# Patient Record
Sex: FEMALE | Race: WHITE | NOT HISPANIC OR LATINO | Employment: FULL TIME | URBAN - METROPOLITAN AREA
[De-identification: names, ages, dates, MRNs, and addresses within clinical notes are randomized per-mention and may not be internally consistent; named-entity substitution may affect disease eponyms.]

---

## 2019-09-11 ENCOUNTER — HISTORICAL (OUTPATIENT)
Dept: ADMINISTRATIVE | Facility: HOSPITAL | Age: 39
End: 2019-09-11

## 2022-04-10 ENCOUNTER — HISTORICAL (OUTPATIENT)
Dept: ADMINISTRATIVE | Facility: HOSPITAL | Age: 42
End: 2022-04-10

## 2022-04-25 VITALS
DIASTOLIC BLOOD PRESSURE: 99 MMHG | WEIGHT: 118.81 LBS | OXYGEN SATURATION: 97 % | SYSTOLIC BLOOD PRESSURE: 138 MMHG | BODY MASS INDEX: 21.05 KG/M2 | HEIGHT: 63 IN

## 2022-11-25 ENCOUNTER — OFFICE VISIT (OUTPATIENT)
Dept: URGENT CARE | Facility: CLINIC | Age: 42
End: 2022-11-25

## 2022-11-25 ENCOUNTER — TELEPHONE (OUTPATIENT)
Dept: URGENT CARE | Facility: CLINIC | Age: 42
End: 2022-11-25

## 2022-11-25 VITALS
BODY MASS INDEX: 19.88 KG/M2 | WEIGHT: 108 LBS | TEMPERATURE: 100 F | SYSTOLIC BLOOD PRESSURE: 139 MMHG | HEART RATE: 89 BPM | DIASTOLIC BLOOD PRESSURE: 81 MMHG | OXYGEN SATURATION: 91 % | HEIGHT: 62 IN

## 2022-11-25 DIAGNOSIS — J20.9 BRONCHOSPASM WITH BRONCHITIS, ACUTE: Primary | ICD-10-CM

## 2022-11-25 DIAGNOSIS — R05.9 COUGH, UNSPECIFIED TYPE: ICD-10-CM

## 2022-11-25 LAB
CTP QC/QA: YES
POC MOLECULAR INFLUENZA A AGN: NEGATIVE
POC MOLECULAR INFLUENZA B AGN: NEGATIVE

## 2022-11-25 PROCEDURE — 87502 INFLUENZA DNA AMP PROBE: CPT | Mod: QW,,, | Performed by: FAMILY MEDICINE

## 2022-11-25 PROCEDURE — 87502 POCT INFLUENZA A/B MOLECULAR: ICD-10-PCS | Mod: QW,,, | Performed by: FAMILY MEDICINE

## 2022-11-25 PROCEDURE — 94640 PR INHAL RX, AIRWAY OBST/DX SPUTUM INDUCT: ICD-10-PCS | Mod: ,,, | Performed by: FAMILY MEDICINE

## 2022-11-25 PROCEDURE — 96372 THER/PROPH/DIAG INJ SC/IM: CPT | Mod: 59,,, | Performed by: FAMILY MEDICINE

## 2022-11-25 PROCEDURE — 96372 PR INJECTION,THERAP/PROPH/DIAG2ST, IM OR SUBCUT: ICD-10-PCS | Mod: 59,,, | Performed by: FAMILY MEDICINE

## 2022-11-25 PROCEDURE — 99203 OFFICE O/P NEW LOW 30 MIN: CPT | Mod: 25,,, | Performed by: FAMILY MEDICINE

## 2022-11-25 PROCEDURE — 99203 PR OFFICE/OUTPT VISIT, NEW, LEVL III, 30-44 MIN: ICD-10-PCS | Mod: 25,,, | Performed by: FAMILY MEDICINE

## 2022-11-25 PROCEDURE — 94640 AIRWAY INHALATION TREATMENT: CPT | Mod: ,,, | Performed by: FAMILY MEDICINE

## 2022-11-25 RX ORDER — DEXAMETHASONE SODIUM PHOSPHATE 100 MG/10ML
4 INJECTION INTRAMUSCULAR; INTRAVENOUS
Status: COMPLETED | OUTPATIENT
Start: 2022-11-25 | End: 2022-11-25

## 2022-11-25 RX ORDER — DEXAMETHASONE SODIUM PHOSPHATE 4 MG/ML
4 INJECTION, SOLUTION INTRA-ARTICULAR; INTRALESIONAL; INTRAMUSCULAR; INTRAVENOUS; SOFT TISSUE
Status: DISCONTINUED | OUTPATIENT
Start: 2022-11-25 | End: 2022-11-25

## 2022-11-25 RX ORDER — PREDNISONE 20 MG/1
20 TABLET ORAL 2 TIMES DAILY
Qty: 4 TABLET | Refills: 0 | Status: SHIPPED | OUTPATIENT
Start: 2022-11-26 | End: 2022-11-28

## 2022-11-25 RX ORDER — LISDEXAMFETAMINE DIMESYLATE 40 MG/1
40 CAPSULE ORAL DAILY
COMMUNITY
Start: 2022-11-16 | End: 2023-06-05

## 2022-11-25 RX ORDER — AZITHROMYCIN 250 MG/1
TABLET, FILM COATED ORAL
Qty: 6 TABLET | Refills: 0 | Status: SHIPPED | OUTPATIENT
Start: 2022-11-25 | End: 2023-06-05

## 2022-11-25 RX ORDER — ALPRAZOLAM 0.25 MG/1
0.25 TABLET ORAL 2 TIMES DAILY PRN
COMMUNITY
Start: 2022-11-20 | End: 2023-09-21

## 2022-11-25 RX ORDER — IPRATROPIUM BROMIDE AND ALBUTEROL SULFATE 2.5; .5 MG/3ML; MG/3ML
3 SOLUTION RESPIRATORY (INHALATION)
Status: COMPLETED | OUTPATIENT
Start: 2022-11-25 | End: 2022-11-25

## 2022-11-25 RX ORDER — CITALOPRAM 40 MG/1
40 TABLET, FILM COATED ORAL DAILY
COMMUNITY
Start: 2022-11-07 | End: 2023-09-21

## 2022-11-25 RX ORDER — ALBUTEROL SULFATE 90 UG/1
2 AEROSOL, METERED RESPIRATORY (INHALATION) EVERY 6 HOURS PRN
Qty: 18 G | Refills: 0 | Status: SHIPPED | OUTPATIENT
Start: 2022-11-25 | End: 2023-06-05

## 2022-11-25 RX ADMIN — DEXAMETHASONE SODIUM PHOSPHATE 4 MG: 100 INJECTION INTRAMUSCULAR; INTRAVENOUS at 09:11

## 2022-11-25 RX ADMIN — IPRATROPIUM BROMIDE AND ALBUTEROL SULFATE 3 ML: 2.5; .5 SOLUTION RESPIRATORY (INHALATION) at 09:11

## 2022-11-25 NOTE — PROGRESS NOTES
"Subjective:       Patient ID: Brittany Hines is a 42 y.o. female.    Vitals:  height is 5' 2" (1.575 m) and weight is 49 kg (108 lb). Her temperature is 99.6 °F (37.6 °C). Her blood pressure is 139/81 and her pulse is 89. Her oxygen saturation is 91% (abnormal).     Chief Complaint: Shortness of Breath (Feels like she can't take a deep breath worsening x 2d, dx with bronchitis annually per pt.)    HPI:  42-year-old female present to clinic with concerns of feeling short of breath since 2 days and worsening.  Diagnosed with bronchitis and Ricardo.  Admits to smoke tobacco.  No history of asthma.  Sats in the clinic 91-93%.  Temp in the clinic 99.6.  States possibly painting in the house and sugar in burning trigger her symptoms.  Admits to smoke tobacco, trying to wean.  Has tolerated Decadron in the past with no issues, understands the risks and benefits    ROS  :  Constitutional : _No fatigue, No Fever  HEENT : _No sore throat, no ear pain, no sinus congestion  Neck : No pain, range of motion present  Respiratory : _Coughing, mucus production  Cardiovascular : _No chest pain, no palpitations  Gastrointestinal : _No vomiting or diarrhea. No abdominal pain  Integumentary : _No skin rash     Objective:      Physical Exam    General : Alert and Oriented, No apparent distress, afebrile, stuffy congested and coughing  Neck - supple  HENT : Oropharynx no redness or swelling. TMs intact mild fluid no redness.   Respiratory : Bilateral equal breath sounds, nonlabored respirations, diffuse expiratory wheezing bilateral basal lung fields, decreased breath sounds, scattered rhonchi  Cardiovascular : Rate, rhythm regular, normal volume pulse, no murmur  Integumentary : Warm, Dry and no rash    After breathing treatment O2 sats much improved to 93%, patient's symptoms much improved.  States initially at home O2 sats was 88%  Assessment:       1. Bronchospasm with bronchitis, acute    2. Cough, unspecified type      "     Plan:     Cool mist vaporizer and cough drop to keep there was moist.  DuoNeb treatment in the clinic today, symptoms much improved including the O2 sats  Encouraged to monitor the symptoms closely.  Adequate hydration and rest.  Flu test negative in the clinic today  Reviewed the chest x-ray, no concerns of pneumonia.  Radiology final results will be monitored and reported  Medications as directed.  Encouraged to start ProAir as needed after 4 hours, patient had neb treatment in the clinic.  Voiced understanding  Tylenol ibuprofen for fever and body aches.  Avoid the possible triggers.  ER precautions with any acute change in symptoms  Call this clinic for any questions  Encouraged to quit smoking, congratulations as patient currently weaning off the cigarettes    Bronchospasm with bronchitis, acute  -     albuterol-ipratropium 2.5 mg-0.5 mg/3 mL nebulizer solution 3 mL  -     Discontinue: dexAMETHasone injection 4 mg  -     dexAMETHasone injection 4 mg  -     XR CHEST PA AND LATERAL; Future; Expected date: 11/25/2022  -     albuterol (PROAIR HFA) 90 mcg/actuation inhaler; Inhale 2 puffs into the lungs every 6 (six) hours as needed for Wheezing or Shortness of Breath. Rescue  Dispense: 18 g; Refill: 0  -     azithromycin (Z-MITCHELL) 250 MG tablet; Take 2 tablets by mouth on day 1; Take 1 tablet by mouth on days 2-5  Dispense: 6 tablet; Refill: 0  -     predniSONE (DELTASONE) 20 MG tablet; Take 1 tablet (20 mg total) by mouth 2 (two) times daily. for 2 days  Dispense: 4 tablet; Refill: 0    Cough, unspecified type  -     POCT Influenza A/B MOLECULAR  -     dexAMETHasone injection 4 mg

## 2022-11-25 NOTE — PATIENT INSTRUCTIONS
Cool mist vaporizer and cough drop to keep there was moist.  DuoNeb treatment in the clinic today, symptoms much improved including the O2 sats  Encouraged to monitor the symptoms closely.  Adequate hydration and rest.  Flu test negative in the clinic today  Reviewed the chest x-ray, no concerns of pneumonia.  Radiology final results will be monitored and reported  Medications as directed.  Encouraged to start ProAir as needed after 4 hours, patient had neb treatment in the clinic.  Voiced understanding  Tylenol ibuprofen for fever and body aches.  Avoid the possible triggers.  ER precautions with any acute change in symptoms  Call this clinic for any questions  Encouraged to quit smoking, congratulations as patient currently weaning off the cigarettes

## 2022-11-27 ENCOUNTER — TELEPHONE (OUTPATIENT)
Dept: URGENT CARE | Facility: CLINIC | Age: 42
End: 2022-11-27

## 2022-11-27 NOTE — TELEPHONE ENCOUNTER
3rd attempt to contact pt no answer left vm       ----- Message from Brenda Ayon, RT sent at 11/26/2022  9:09 AM CST -----  2nd attempt to contact pt no answer left vm   ----- Message -----  From: Brenda Ayon, RT  Sent: 11/25/2022   1:31 PM CST  To: [facility] Clinical Support Staff    Tried calling pt with xray results- no answer left vm   ----- Message -----  From: Brenda Ayon, RT  Sent: 11/25/2022   1:07 PM CST  To: [facility] Clinical Support Staff

## 2023-06-05 ENCOUNTER — OFFICE VISIT (OUTPATIENT)
Dept: INTERNAL MEDICINE | Facility: CLINIC | Age: 43
End: 2023-06-05
Payer: COMMERCIAL

## 2023-06-05 VITALS
SYSTOLIC BLOOD PRESSURE: 118 MMHG | BODY MASS INDEX: 18.43 KG/M2 | HEIGHT: 63 IN | TEMPERATURE: 98 F | HEART RATE: 72 BPM | WEIGHT: 104 LBS | DIASTOLIC BLOOD PRESSURE: 74 MMHG | OXYGEN SATURATION: 98 %

## 2023-06-05 DIAGNOSIS — F98.8 ATTENTION DEFICIT DISORDER (ADD) WITHOUT HYPERACTIVITY: ICD-10-CM

## 2023-06-05 DIAGNOSIS — F41.9 ANXIETY AND DEPRESSION: ICD-10-CM

## 2023-06-05 DIAGNOSIS — Z00.00 WELLNESS EXAMINATION: Primary | ICD-10-CM

## 2023-06-05 DIAGNOSIS — F32.A ANXIETY AND DEPRESSION: ICD-10-CM

## 2023-06-05 DIAGNOSIS — Z76.89 ESTABLISHING CARE WITH NEW DOCTOR, ENCOUNTER FOR: ICD-10-CM

## 2023-06-05 PROCEDURE — 1159F PR MEDICATION LIST DOCUMENTED IN MEDICAL RECORD: ICD-10-PCS | Mod: CPTII,,, | Performed by: INTERNAL MEDICINE

## 2023-06-05 PROCEDURE — 1160F RVW MEDS BY RX/DR IN RCRD: CPT | Mod: CPTII,,, | Performed by: INTERNAL MEDICINE

## 2023-06-05 PROCEDURE — 1159F MED LIST DOCD IN RCRD: CPT | Mod: CPTII,,, | Performed by: INTERNAL MEDICINE

## 2023-06-05 PROCEDURE — 3008F BODY MASS INDEX DOCD: CPT | Mod: CPTII,,, | Performed by: INTERNAL MEDICINE

## 2023-06-05 PROCEDURE — 3078F PR MOST RECENT DIASTOLIC BLOOD PRESSURE < 80 MM HG: ICD-10-PCS | Mod: CPTII,,, | Performed by: INTERNAL MEDICINE

## 2023-06-05 PROCEDURE — 99204 OFFICE O/P NEW MOD 45 MIN: CPT | Mod: ,,, | Performed by: INTERNAL MEDICINE

## 2023-06-05 PROCEDURE — 3008F PR BODY MASS INDEX (BMI) DOCUMENTED: ICD-10-PCS | Mod: CPTII,,, | Performed by: INTERNAL MEDICINE

## 2023-06-05 PROCEDURE — 1160F PR REVIEW ALL MEDS BY PRESCRIBER/CLIN PHARMACIST DOCUMENTED: ICD-10-PCS | Mod: CPTII,,, | Performed by: INTERNAL MEDICINE

## 2023-06-05 PROCEDURE — 3078F DIAST BP <80 MM HG: CPT | Mod: CPTII,,, | Performed by: INTERNAL MEDICINE

## 2023-06-05 PROCEDURE — 3074F PR MOST RECENT SYSTOLIC BLOOD PRESSURE < 130 MM HG: ICD-10-PCS | Mod: CPTII,,, | Performed by: INTERNAL MEDICINE

## 2023-06-05 PROCEDURE — 99204 PR OFFICE/OUTPT VISIT, NEW, LEVL IV, 45-59 MIN: ICD-10-PCS | Mod: ,,, | Performed by: INTERNAL MEDICINE

## 2023-06-05 PROCEDURE — 3074F SYST BP LT 130 MM HG: CPT | Mod: CPTII,,, | Performed by: INTERNAL MEDICINE

## 2023-06-05 RX ORDER — LISDEXAMFETAMINE DIMESYLATE 30 MG/1
30 CAPSULE ORAL EVERY MORNING
Qty: 30 CAPSULE | Refills: 0 | Status: SHIPPED | OUTPATIENT
Start: 2023-06-05 | End: 2023-07-13 | Stop reason: SDUPTHER

## 2023-06-05 NOTE — ASSESSMENT & PLAN NOTE
-labs will be ordered for next visit  -patient is advised on importance of watching her carbohydrate intake and saturated fat intake, making the right nutritional choices and exercising on a regular basis  -up-to-date with the screening

## 2023-06-05 NOTE — ASSESSMENT & PLAN NOTE
-on Vyvanse 40 mg p.o. daily   -patient would like to go back down on the dose and we discussed going down to 30 mg and slowly titrating down as tolerated and she is in agreement   -currently tolerating it well without any issues or excessive side effects

## 2023-06-05 NOTE — PROGRESS NOTES
Subjective:      Patient ID: Brittany Hines is a 42 y.o. female.    Chief Complaint: No chief complaint on file.    Brittany is a 42-year-old female who is here today to establish care.  Medical comorbidities are significant for anxiety/depression as well as ADHD.    Overall doing well and has no acute needs or complaints however does have some guilt from the death of her father and hence continues to struggle with anxiety/depression.    Would like to scale back on the dose of the Vyvanse.    Had a long discussion regarding the Xanax and using it only on an as needed basis.    Pap:  request records  MMG:  Ordered today      Gyn:  Dr. Agustin    The patient's Health Maintenance was reviewed and the following appears to be due at this time:   Health Maintenance Due   Topic Date Due    Hepatitis C Screening  Never done    Cervical Cancer Screening  Never done    Lipid Panel  Never done    COVID-19 Vaccine (1) Never done    Pneumococcal Vaccines (Age 0-64) (1 - PCV) Never done    HIV Screening  Never done    TETANUS VACCINE  Never done    Mammogram  Never done        Past Medical History:  Past Medical History:   Diagnosis Date    Anxiety     Depression      Past Surgical History:   Procedure Laterality Date    NO PAST SURGERIES       Review of patient's allergies indicates:  No Known Allergies  Social History     Socioeconomic History    Marital status: Single   Tobacco Use    Smoking status: Every Day     Packs/day: 1.00     Years: 15.00     Pack years: 15.00     Types: Cigarettes     Start date: 1995    Smokeless tobacco: Never   Substance and Sexual Activity    Alcohol use: Yes     Comment: occasionally    Drug use: Not Currently    Sexual activity: Not Currently     Family History   Problem Relation Age of Onset    Heart disease Mother     Heart disease Father     Heart disease Maternal Grandmother     Heart disease Maternal Grandfather        Review of Systems    A comprehensive review of systems was  "performed and is negative except for that stated above  Objective:   /74   Pulse 72   Temp 98.1 °F (36.7 °C)   Ht 5' 3" (1.6 m)   Wt 47.2 kg (104 lb)   SpO2 98%   BMI 18.42 kg/m²     Physical Exam  Constitutional:       Appearance: Normal appearance.   HENT:      Head: Normocephalic and atraumatic.      Nose: Nose normal.      Mouth/Throat:      Mouth: Mucous membranes are moist.      Pharynx: Oropharynx is clear.   Eyes:      Extraocular Movements: Extraocular movements intact.      Pupils: Pupils are equal, round, and reactive to light.   Cardiovascular:      Rate and Rhythm: Normal rate and regular rhythm.      Pulses: Normal pulses.   Pulmonary:      Effort: Pulmonary effort is normal.      Breath sounds: Normal breath sounds.   Abdominal:      General: Bowel sounds are normal.      Palpations: Abdomen is soft.   Musculoskeletal:         General: Normal range of motion.      Cervical back: Normal range of motion and neck supple.   Skin:     General: Skin is warm.   Neurological:      General: No focal deficit present.      Mental Status: She is alert and oriented to person, place, and time. Mental status is at baseline.   Psychiatric:         Mood and Affect: Mood normal.     Assessment/ Plan:   1. Establishing care with new doctor, encounter for  Assessment & Plan:  -labs will be ordered for next visit  -patient is advised on importance of watching her carbohydrate intake and saturated fat intake, making the right nutritional choices and exercising on a regular basis  -up-to-date with the screening      2. Anxiety and depression  Assessment & Plan:  -on Celexa 40 mg p.o. daily and alprazolam 0.25 mg p.o. b.i.d., advised to use the Xanax only on a very p.r.n. basis and to refrain from using it daily.  Verbalizes understanding  Practice deep breathing or abdominal breathing exercises when anxiety occurs.  Exercise daily. Get sunlight daily.  Avoid caffeine, alcohol and stimulants.  Practice positive " phrases and repeat throughout the day, along with yoga and relaxation techniques.  Set healthy boundaries, avoid people and conversations that increase stress.  Educated patient on the risks of serotonin based medications such as serotonin modulators and SSRIs/SNRIs including common side effects of nausea, GI upset, headache dizziness as well as the rare risk for worsening symptoms of depression including development of suicidal thoughts or ideations, and serotonin syndrome.         3. Attention deficit disorder (ADD) without hyperactivity  Assessment & Plan:  -on Vyvanse 40 mg p.o. daily   -patient would like to go back down on the dose and we discussed going down to 30 mg and slowly titrating down as tolerated and she is in agreement   -currently tolerating it well without any issues or excessive side effects           Other orders  -     lisdexamfetamine (VYVANSE) 30 MG capsule; Take 1 capsule (30 mg total) by mouth every morning.  Dispense: 30 capsule; Refill: 0

## 2023-06-05 NOTE — ASSESSMENT & PLAN NOTE
-on Celexa 40 mg p.o. daily and alprazolam 0.25 mg p.o. b.i.d., advised to use the Xanax only on a very p.r.n. basis and to refrain from using it daily.  Verbalizes understanding  Practice deep breathing or abdominal breathing exercises when anxiety occurs.  Exercise daily. Get sunlight daily.  Avoid caffeine, alcohol and stimulants.  Practice positive phrases and repeat throughout the day, along with yoga and relaxation techniques.  Set healthy boundaries, avoid people and conversations that increase stress.  Educated patient on the risks of serotonin based medications such as serotonin modulators and SSRIs/SNRIs including common side effects of nausea, GI upset, headache dizziness as well as the rare risk for worsening symptoms of depression including development of suicidal thoughts or ideations, and serotonin syndrome.

## 2023-07-13 DIAGNOSIS — F98.8 ATTENTION DEFICIT DISORDER (ADD) WITHOUT HYPERACTIVITY: Primary | ICD-10-CM

## 2023-07-13 RX ORDER — LISDEXAMFETAMINE DIMESYLATE 30 MG/1
30 CAPSULE ORAL EVERY MORNING
Qty: 30 CAPSULE | Refills: 0 | Status: SHIPPED | OUTPATIENT
Start: 2023-07-13 | End: 2023-08-14 | Stop reason: SDUPTHER

## 2023-08-14 DIAGNOSIS — F98.8 ATTENTION DEFICIT DISORDER (ADD) WITHOUT HYPERACTIVITY: ICD-10-CM

## 2023-08-14 RX ORDER — LISDEXAMFETAMINE DIMESYLATE 30 MG/1
30 CAPSULE ORAL EVERY MORNING
Qty: 30 CAPSULE | Refills: 0 | Status: SHIPPED | OUTPATIENT
Start: 2023-08-14 | End: 2023-09-14

## 2023-08-24 DIAGNOSIS — Z00.00 WELLNESS EXAMINATION: Primary | ICD-10-CM

## 2023-08-28 ENCOUNTER — TELEPHONE (OUTPATIENT)
Dept: INTERNAL MEDICINE | Facility: CLINIC | Age: 43
End: 2023-08-28
Payer: COMMERCIAL

## 2023-08-28 NOTE — TELEPHONE ENCOUNTER
----- Message from Soledad Ramirez MA sent at 8/24/2023  7:59 AM CDT -----  Regarding: Dr AMANDA HYATT Tuesday 9-5-23       1. Are there any outstanding Labs, imaging or referrals in the patient's chart?      Wellness Appointment    Fasting wellness labs ordered and ready to do.     New Pt 6-5-23           2. . Has the patient been seen in an ER, urgent care clinic, or any other health care    provider since their last visit? If yes when and where?

## 2023-09-14 ENCOUNTER — TELEPHONE (OUTPATIENT)
Dept: INTERNAL MEDICINE | Facility: CLINIC | Age: 43
End: 2023-09-14
Payer: COMMERCIAL

## 2023-09-14 RX ORDER — LISDEXAMFETAMINE DIMESYLATE CAPSULES 20 MG/1
20 CAPSULE ORAL EVERY MORNING
Qty: 30 CAPSULE | Refills: 0 | Status: SHIPPED | OUTPATIENT
Start: 2023-09-14 | End: 2023-09-21

## 2023-09-14 NOTE — TELEPHONE ENCOUNTER
Medications Ordered This Encounter   Medications    lisdexamfetamine (VYVANSE) 20 MG capsule     Sig: Take 1 capsule (20 mg total) by mouth every morning.     Dispense:  30 capsule     Refill:  0      Decreased dose of Vyvanse to 20 mg, prescription sent, please notify patient, thank you

## 2023-09-14 NOTE — TELEPHONE ENCOUNTER
----- Message from Kimberly Marie sent at 9/14/2023  9:06 AM CDT -----  Regarding: refill  Type:  RX Refill Request    Who Called: pt  Refill or New Rx:refill  RX Name and Strength:lisdexamfetamine (VYVANSE) 30 MG capsule  How is the patient currently taking it? (ex. 1XDay):1 day  Is this a 30 day or 90 day RX:  [facility]  Local or Mail Order:local  Ordering Provider:dr chapman  Would the patient rather a call back or a response via [facility]? C/b  Best Call Back Number:[phone]    Additional Information: pt would like to discuss lowering the dosage

## 2023-09-21 ENCOUNTER — OFFICE VISIT (OUTPATIENT)
Dept: INTERNAL MEDICINE | Facility: CLINIC | Age: 43
End: 2023-09-21
Payer: COMMERCIAL

## 2023-09-21 VITALS
RESPIRATION RATE: 18 BRPM | TEMPERATURE: 98 F | DIASTOLIC BLOOD PRESSURE: 68 MMHG | HEART RATE: 91 BPM | WEIGHT: 107.38 LBS | SYSTOLIC BLOOD PRESSURE: 122 MMHG | OXYGEN SATURATION: 99 % | BODY MASS INDEX: 19.03 KG/M2

## 2023-09-21 DIAGNOSIS — Z12.31 BREAST CANCER SCREENING BY MAMMOGRAM: ICD-10-CM

## 2023-09-21 DIAGNOSIS — Z12.4 CERVICAL CANCER SCREENING: ICD-10-CM

## 2023-09-21 DIAGNOSIS — F98.8 ATTENTION DEFICIT DISORDER (ADD) WITHOUT HYPERACTIVITY: Chronic | ICD-10-CM

## 2023-09-21 DIAGNOSIS — Z23 NEED FOR INFLUENZA VACCINATION: ICD-10-CM

## 2023-09-21 DIAGNOSIS — F41.9 ANXIETY AND DEPRESSION: Chronic | ICD-10-CM

## 2023-09-21 DIAGNOSIS — B02.8 HERPES ZOSTER WITH COMPLICATION: ICD-10-CM

## 2023-09-21 DIAGNOSIS — F32.A ANXIETY AND DEPRESSION: Chronic | ICD-10-CM

## 2023-09-21 DIAGNOSIS — Z00.00 WELLNESS EXAMINATION: Primary | ICD-10-CM

## 2023-09-21 DIAGNOSIS — Z12.11 SCREENING FOR COLORECTAL CANCER: ICD-10-CM

## 2023-09-21 DIAGNOSIS — Z12.12 SCREENING FOR COLORECTAL CANCER: ICD-10-CM

## 2023-09-21 PROBLEM — Z76.89 ESTABLISHING CARE WITH NEW DOCTOR, ENCOUNTER FOR: Status: RESOLVED | Noted: 2023-06-05 | Resolved: 2023-09-21

## 2023-09-21 PROCEDURE — 1160F RVW MEDS BY RX/DR IN RCRD: CPT | Mod: CPTII,,,

## 2023-09-21 PROCEDURE — 3074F PR MOST RECENT SYSTOLIC BLOOD PRESSURE < 130 MM HG: ICD-10-PCS | Mod: CPTII,,,

## 2023-09-21 PROCEDURE — 1159F PR MEDICATION LIST DOCUMENTED IN MEDICAL RECORD: ICD-10-PCS | Mod: CPTII,,,

## 2023-09-21 PROCEDURE — 99396 PR PREVENTIVE VISIT,EST,40-64: ICD-10-PCS | Mod: ,,,

## 2023-09-21 PROCEDURE — 3008F PR BODY MASS INDEX (BMI) DOCUMENTED: ICD-10-PCS | Mod: CPTII,,,

## 2023-09-21 PROCEDURE — 3008F BODY MASS INDEX DOCD: CPT | Mod: CPTII,,,

## 2023-09-21 PROCEDURE — 3078F PR MOST RECENT DIASTOLIC BLOOD PRESSURE < 80 MM HG: ICD-10-PCS | Mod: CPTII,,,

## 2023-09-21 PROCEDURE — 3074F SYST BP LT 130 MM HG: CPT | Mod: CPTII,,,

## 2023-09-21 PROCEDURE — 1160F PR REVIEW ALL MEDS BY PRESCRIBER/CLIN PHARMACIST DOCUMENTED: ICD-10-PCS | Mod: CPTII,,,

## 2023-09-21 PROCEDURE — 99396 PREV VISIT EST AGE 40-64: CPT | Mod: ,,,

## 2023-09-21 PROCEDURE — 3078F DIAST BP <80 MM HG: CPT | Mod: CPTII,,,

## 2023-09-21 PROCEDURE — 1159F MED LIST DOCD IN RCRD: CPT | Mod: CPTII,,,

## 2023-09-21 RX ORDER — ALPRAZOLAM 0.5 MG/1
0.5 TABLET ORAL 2 TIMES DAILY PRN
COMMUNITY
Start: 2023-09-11

## 2023-09-21 RX ORDER — VALACYCLOVIR HYDROCHLORIDE 1 G/1
1000 TABLET, FILM COATED ORAL 3 TIMES DAILY
Qty: 21 TABLET | Refills: 0 | Status: SHIPPED | OUTPATIENT
Start: 2023-09-21 | End: 2023-12-28

## 2023-09-21 RX ORDER — DEXTROAMPHETAMINE SACCHARATE, AMPHETAMINE ASPARTATE MONOHYDRATE, DEXTROAMPHETAMINE SULFATE AND AMPHETAMINE SULFATE 2.5; 2.5; 2.5; 2.5 MG/1; MG/1; MG/1; MG/1
10 CAPSULE, EXTENDED RELEASE ORAL EVERY MORNING
Qty: 30 CAPSULE | Refills: 0 | Status: SHIPPED | OUTPATIENT
Start: 2023-10-11 | End: 2023-11-20 | Stop reason: SDUPTHER

## 2023-09-21 RX ORDER — GABAPENTIN 100 MG/1
100 CAPSULE ORAL 2 TIMES DAILY
Qty: 28 CAPSULE | Refills: 0 | Status: SHIPPED | OUTPATIENT
Start: 2023-09-21 | End: 2023-12-28

## 2023-09-21 RX ORDER — CITALOPRAM 10 MG/1
20 TABLET ORAL DAILY
Qty: 60 TABLET | Refills: 2 | Status: SHIPPED | OUTPATIENT
Start: 2023-09-21 | End: 2024-04-01

## 2023-09-21 NOTE — ASSESSMENT & PLAN NOTE
-age-appropriate screenings reviewed and cervical cancer screening noted due  -cervical cancer screening discussed. Patient verbalized understanding of importance of completing   -referred to gyn

## 2023-09-21 NOTE — ASSESSMENT & PLAN NOTE
-stable  -requesting to switch from Vyvanse to cheaper altered; discussed Adderall XR formulary   -currently on Vyvanse 20 mg, discontinue  -initiate Adderall XR 10 mg daily, titrate up as need  -follow-up q.3 months for refills  -notify clinic for new or worsening symtoms

## 2023-09-21 NOTE — ASSESSMENT & PLAN NOTE
-age-appropriate screenings reviewed and mammogram noted due  -breast cancer screening discussed. Patient verbalized understanding   -order mammogram today

## 2023-09-21 NOTE — ASSESSMENT & PLAN NOTE
-stable   -attempting to wean down off of Celexa   -currently on Celexa 20 mg, prescribe Celexa 10 mg tablet to facilitate  -also on Xanax 0.5 mg b.i.d. p.r.n., encouraged to use sparingly

## 2023-09-21 NOTE — ASSESSMENT & PLAN NOTE
-age-appropriate screenings reviewed and colorectal cancer screening noted due  -colorectal cancer screening screening significance discussed. Patient verbalized understanding   -screening methods discussed  -order Referral for colonoscopy Today

## 2023-09-21 NOTE — ASSESSMENT & PLAN NOTE
-acute  -initiate valacyclovir a 1000 mg t.i.d. x7 days   -initiate gabapentin 100 mg b.i.d. for nerve pain   -okay to utilize OTC topical lidocaine   -hand hygiene encouraged   -shingles vaccine disccused

## 2023-09-21 NOTE — ASSESSMENT & PLAN NOTE
-patient feeling generally well today  -will obtain wellness labs within next few days  -age-appropriate screenings reviewed, order mammogram, refer for Pap and colon  -immunizations ,reviewed and discussed  -encourage routine aerobic exercise 2 to 3 times a week  -increase fluid hydration

## 2023-09-21 NOTE — ASSESSMENT & PLAN NOTE
-immunizations reviewed and influenza vaccine noted due  -influenza l infection and immunization discussed. Patient verbalized understanding   -reports will obtain influenza vaccine at work

## 2023-10-17 ENCOUNTER — PATIENT MESSAGE (OUTPATIENT)
Dept: ADMINISTRATIVE | Facility: HOSPITAL | Age: 43
End: 2023-10-17
Payer: COMMERCIAL

## 2023-11-20 DIAGNOSIS — F98.8 ATTENTION DEFICIT DISORDER (ADD) WITHOUT HYPERACTIVITY: Chronic | ICD-10-CM

## 2023-11-20 RX ORDER — DEXTROAMPHETAMINE SACCHARATE, AMPHETAMINE ASPARTATE MONOHYDRATE, DEXTROAMPHETAMINE SULFATE AND AMPHETAMINE SULFATE 2.5; 2.5; 2.5; 2.5 MG/1; MG/1; MG/1; MG/1
10 CAPSULE, EXTENDED RELEASE ORAL EVERY MORNING
Qty: 30 CAPSULE | Refills: 0 | Status: SHIPPED | OUTPATIENT
Start: 2023-11-20 | End: 2023-12-20 | Stop reason: SDUPTHER

## 2023-11-20 NOTE — TELEPHONE ENCOUNTER
----- Message from Carlie Díaz sent at 11/20/2023  9:24 AM CST -----  Regarding: refill  ,Type:  RX Refill Request    Who Called: Brittany  Refill or New Rx:refill  RX Name and Strength:dextroamphetamine-amphetamine (ADDERALL XR) 10 MG 24 hr capsule  How is the patient currently taking it? (ex. 1XDay):  Is this a 30 day or 90 day RX:  [facility]  Local or Mail Order:  Ordering Provider:  Would the patient rather a call back or a response via [facility]?   Best Call Back Number:  Additional Information:

## 2023-12-20 DIAGNOSIS — F98.8 ATTENTION DEFICIT DISORDER (ADD) WITHOUT HYPERACTIVITY: Chronic | ICD-10-CM

## 2023-12-20 RX ORDER — DEXTROAMPHETAMINE SACCHARATE, AMPHETAMINE ASPARTATE MONOHYDRATE, DEXTROAMPHETAMINE SULFATE AND AMPHETAMINE SULFATE 2.5; 2.5; 2.5; 2.5 MG/1; MG/1; MG/1; MG/1
10 CAPSULE, EXTENDED RELEASE ORAL EVERY MORNING
Qty: 30 CAPSULE | Refills: 0 | Status: SHIPPED | OUTPATIENT
Start: 2023-12-20 | End: 2023-12-28 | Stop reason: SDUPTHER

## 2023-12-20 NOTE — TELEPHONE ENCOUNTER
----- Message from Carlie Díaz sent at 12/20/2023 10:53 AM CST -----  Regarding: refill  Type:  RX Refill Request    Who Called: Brittany    Refill or New Rx:refill    RX Name and Strength:dextroamphetamine-amphetamine (ADDERALL XR) 10 MG 24 hr capsule    How is the patient currently taking it? (ex. 1XDay):    Is this a 30 day or 90 day RX:    [facility]erred [facility] with phone number[facility]    Local or Mail Order:    Ordering Provider:    Would the patient rather a call back or a response via [facility]?     Best Call Back Number:    Additional Information:

## 2023-12-25 PROBLEM — Z00.00 WELLNESS EXAMINATION: Status: RESOLVED | Noted: 2023-09-21 | Resolved: 2023-12-25

## 2023-12-28 ENCOUNTER — TELEPHONE (OUTPATIENT)
Dept: INTERNAL MEDICINE | Facility: CLINIC | Age: 43
End: 2023-12-28

## 2023-12-28 ENCOUNTER — OFFICE VISIT (OUTPATIENT)
Dept: INTERNAL MEDICINE | Facility: CLINIC | Age: 43
End: 2023-12-28
Payer: COMMERCIAL

## 2023-12-28 VITALS
TEMPERATURE: 101 F | OXYGEN SATURATION: 96 % | BODY MASS INDEX: 18 KG/M2 | RESPIRATION RATE: 16 BRPM | DIASTOLIC BLOOD PRESSURE: 78 MMHG | HEART RATE: 93 BPM | WEIGHT: 101.63 LBS | SYSTOLIC BLOOD PRESSURE: 126 MMHG

## 2023-12-28 DIAGNOSIS — F32.A ANXIETY AND DEPRESSION: Chronic | ICD-10-CM

## 2023-12-28 DIAGNOSIS — J06.9 UPPER RESPIRATORY TRACT INFECTION, UNSPECIFIED TYPE: ICD-10-CM

## 2023-12-28 DIAGNOSIS — J10.1 INFLUENZA B: Primary | ICD-10-CM

## 2023-12-28 DIAGNOSIS — J06.9 UPPER RESPIRATORY TRACT INFECTION, UNSPECIFIED TYPE: Primary | ICD-10-CM

## 2023-12-28 DIAGNOSIS — R05.9 COUGH, UNSPECIFIED TYPE: ICD-10-CM

## 2023-12-28 DIAGNOSIS — F41.9 ANXIETY AND DEPRESSION: Chronic | ICD-10-CM

## 2023-12-28 DIAGNOSIS — J02.9 SORE THROAT: ICD-10-CM

## 2023-12-28 DIAGNOSIS — F98.8 ATTENTION DEFICIT DISORDER (ADD) WITHOUT HYPERACTIVITY: Chronic | ICD-10-CM

## 2023-12-28 DIAGNOSIS — R06.2 WHEEZING: ICD-10-CM

## 2023-12-28 LAB
CTP QC/QA: YES
FLUAV AG UPPER RESP QL IA.RAPID: NOT DETECTED
FLUBV AG UPPER RESP QL IA.RAPID: DETECTED
MOLECULAR STREP A: NEGATIVE
RSV A 5' UTR RNA NPH QL NAA+PROBE: NOT DETECTED
SARS-COV-2 RNA RESP QL NAA+PROBE: NOT DETECTED

## 2023-12-28 PROCEDURE — 1159F PR MEDICATION LIST DOCUMENTED IN MEDICAL RECORD: ICD-10-PCS | Mod: CPTII,,,

## 2023-12-28 PROCEDURE — 3074F SYST BP LT 130 MM HG: CPT | Mod: CPTII,,,

## 2023-12-28 PROCEDURE — 99214 PR OFFICE/OUTPT VISIT, EST, LEVL IV, 30-39 MIN: ICD-10-PCS | Mod: 25,,,

## 2023-12-28 PROCEDURE — 3074F PR MOST RECENT SYSTOLIC BLOOD PRESSURE < 130 MM HG: ICD-10-PCS | Mod: CPTII,,,

## 2023-12-28 PROCEDURE — 99214 OFFICE O/P EST MOD 30 MIN: CPT | Mod: 25,,,

## 2023-12-28 PROCEDURE — 1160F PR REVIEW ALL MEDS BY PRESCRIBER/CLIN PHARMACIST DOCUMENTED: ICD-10-PCS | Mod: CPTII,,,

## 2023-12-28 PROCEDURE — 96372 PR INJECTION,THERAP/PROPH/DIAG2ST, IM OR SUBCUT: ICD-10-PCS | Mod: ,,,

## 2023-12-28 PROCEDURE — 96372 THER/PROPH/DIAG INJ SC/IM: CPT | Mod: ,,,

## 2023-12-28 PROCEDURE — 3008F BODY MASS INDEX DOCD: CPT | Mod: CPTII,,,

## 2023-12-28 PROCEDURE — 87651 STREP A DNA AMP PROBE: CPT | Mod: QW,,,

## 2023-12-28 PROCEDURE — 3078F DIAST BP <80 MM HG: CPT | Mod: CPTII,,,

## 2023-12-28 PROCEDURE — 0241U COVID/RSV/FLU A&B PCR: CPT

## 2023-12-28 PROCEDURE — 3008F PR BODY MASS INDEX (BMI) DOCUMENTED: ICD-10-PCS | Mod: CPTII,,,

## 2023-12-28 PROCEDURE — 1159F MED LIST DOCD IN RCRD: CPT | Mod: CPTII,,,

## 2023-12-28 PROCEDURE — 1160F RVW MEDS BY RX/DR IN RCRD: CPT | Mod: CPTII,,,

## 2023-12-28 PROCEDURE — 3078F PR MOST RECENT DIASTOLIC BLOOD PRESSURE < 80 MM HG: ICD-10-PCS | Mod: CPTII,,,

## 2023-12-28 PROCEDURE — 87651 POCT STREP A MOLECULAR: ICD-10-PCS | Mod: QW,,,

## 2023-12-28 RX ORDER — BALOXAVIR MARBOXIL 80 MG/1
80 TABLET, FILM COATED ORAL ONCE
Qty: 1 TABLET | Refills: 0 | Status: SHIPPED | OUTPATIENT
Start: 2023-12-28 | End: 2023-12-28

## 2023-12-28 RX ORDER — DEXTROAMPHETAMINE SACCHARATE, AMPHETAMINE ASPARTATE MONOHYDRATE, DEXTROAMPHETAMINE SULFATE AND AMPHETAMINE SULFATE 2.5; 2.5; 2.5; 2.5 MG/1; MG/1; MG/1; MG/1
10 CAPSULE, EXTENDED RELEASE ORAL EVERY MORNING
Qty: 30 CAPSULE | Refills: 0 | Status: SHIPPED | OUTPATIENT
Start: 2023-12-28 | End: 2024-01-27

## 2023-12-28 RX ORDER — DEXAMETHASONE SODIUM PHOSPHATE 4 MG/ML
4 INJECTION, SOLUTION INTRA-ARTICULAR; INTRALESIONAL; INTRAMUSCULAR; INTRAVENOUS; SOFT TISSUE
Status: COMPLETED | OUTPATIENT
Start: 2023-12-28 | End: 2023-12-28

## 2023-12-28 RX ORDER — BENZONATATE 100 MG/1
100 CAPSULE ORAL 3 TIMES DAILY PRN
Qty: 30 CAPSULE | Refills: 0 | Status: SHIPPED | OUTPATIENT
Start: 2023-12-28 | End: 2024-01-07

## 2023-12-28 RX ORDER — FLUTICASONE PROPIONATE 50 MCG
2 SPRAY, SUSPENSION (ML) NASAL DAILY
Qty: 9.9 ML | Refills: 0 | Status: SHIPPED | OUTPATIENT
Start: 2023-12-28 | End: 2023-12-28

## 2023-12-28 RX ORDER — DEXTROAMPHETAMINE SACCHARATE, AMPHETAMINE ASPARTATE MONOHYDRATE, DEXTROAMPHETAMINE SULFATE AND AMPHETAMINE SULFATE 2.5; 2.5; 2.5; 2.5 MG/1; MG/1; MG/1; MG/1
10 CAPSULE, EXTENDED RELEASE ORAL EVERY MORNING
Qty: 30 CAPSULE | Refills: 0 | Status: SHIPPED | OUTPATIENT
Start: 2024-01-26 | End: 2024-02-25

## 2023-12-28 RX ORDER — FLUTICASONE PROPIONATE 50 MCG
SPRAY, SUSPENSION (ML) NASAL
Qty: 48 G | Refills: 3 | Status: SHIPPED | OUTPATIENT
Start: 2023-12-28

## 2023-12-28 RX ORDER — LORATADINE 10 MG/1
10 TABLET ORAL DAILY
Qty: 14 TABLET | Refills: 0 | Status: SHIPPED | OUTPATIENT
Start: 2023-12-28 | End: 2024-04-01

## 2023-12-28 RX ORDER — DEXTROAMPHETAMINE SACCHARATE, AMPHETAMINE ASPARTATE MONOHYDRATE, DEXTROAMPHETAMINE SULFATE AND AMPHETAMINE SULFATE 2.5; 2.5; 2.5; 2.5 MG/1; MG/1; MG/1; MG/1
10 CAPSULE, EXTENDED RELEASE ORAL EVERY MORNING
Qty: 30 CAPSULE | Refills: 0 | Status: SHIPPED | OUTPATIENT
Start: 2024-02-24 | End: 2024-03-05 | Stop reason: SDUPTHER

## 2023-12-28 RX ORDER — ALBUTEROL SULFATE 90 UG/1
2 AEROSOL, METERED RESPIRATORY (INHALATION) EVERY 6 HOURS PRN
Qty: 18 G | Refills: 0 | Status: SHIPPED | OUTPATIENT
Start: 2023-12-28 | End: 2024-12-27

## 2023-12-28 RX ADMIN — DEXAMETHASONE SODIUM PHOSPHATE 4 MG: 4 INJECTION, SOLUTION INTRA-ARTICULAR; INTRALESIONAL; INTRAMUSCULAR; INTRAVENOUS; SOFT TISSUE at 08:12

## 2023-12-28 NOTE — ASSESSMENT & PLAN NOTE
-obtain COVID/RSV/FLU PCR  -IM dexamethasone in office  -initiate Antihistamine  -initiate Flonase BID  -initiate albuterol p.r.n. wheezing/shortness a breath  -Steam inhalation  -okay to utilize Vit C , Vit D, and Zinc  -safety precautions/quarantine discussed   -notify clinic for any new or worsening symptoms

## 2023-12-28 NOTE — ASSESSMENT & PLAN NOTE
-obtain COVID/RSV/FLU PCR  -in office strep: Negative  -initiate Antihistamine  -initiate Flonase BID  -initiate Tessalon Perles p.r.n. cough   -initiate albuterol p.r.n. wheezing/shortness a breath  -Steam inhalation  -Tylenol/ibuprofen for body aches and low-grade temps  -okay to utilize Vit C , Vit D, and Zinc  -safety precautions/quarantine discussed   -notify clinic for any new or worsening symptoms

## 2023-12-28 NOTE — ASSESSMENT & PLAN NOTE
-obtain COVID/RSV/FLU PCR  -in office strep: Negative  -Tylenol/ibuprofen for body aches and low-grade temps

## 2023-12-28 NOTE — ASSESSMENT & PLAN NOTE
-currently on Adderall XR 10 mg daily, continue  -notify office for any worsening symptoms  -follow-up q.3 months for refills

## 2023-12-28 NOTE — TELEPHONE ENCOUNTER
----- Message from CHEYANNE Ghosh sent at 12/28/2023  9:40 AM CST -----  Please contact patient and schedule a three-month follow-up for ADD

## 2023-12-28 NOTE — PROGRESS NOTES
Patient ID: Brittany Hines is a 43 y.o. female.    Chief Complaint: Follow-up (ADHD meds working well, pt currently has cough, congestion, fever, body aches, headaches, sore throat, yellow mucous, )    43-year-old female here today for three-month follow-up visit.  Medical comorbidities include anxiety/depression as well as ADHD.    Last visit patient switched from Vyvanse 20 mg to Adderall XR 10mg,.  Today reports tolerating well, wishing to continue on Adderall XR.  No palpitations, chest pain.  Does have some reports intermittent headaches, however new onset with new URI symptoms.         Wellness: 09/21/2023      URI   This is a new problem. The current episode started in the past 7 days. The problem has been gradually worsening. The maximum temperature recorded prior to her arrival was 101 - 101.9 F. The fever has been present for 1 to 2 days. Associated symptoms include congestion, coughing, headaches, joint pain, rhinorrhea, sinus pain, sneezing, a sore throat, swollen glands and wheezing. Pertinent negatives include no abdominal pain, chest pain, diarrhea, dysuria, nausea, plugged ear sensation, rash or vomiting. She has tried decongestant and increased fluids for the symptoms. The treatment provided mild relief.       MEDICAL HISTORY:    Past Medical History:   Diagnosis Date    Attention deficit disorder (ADD) without hyperactivity 6/5/2023      Past Surgical History:   Procedure Laterality Date    NO PAST SURGERIES        Social History     Tobacco Use    Smoking status: Every Day     Current packs/day: 1.00     Average packs/day: 1 pack/day for 29.0 years (29.0 ttl pk-yrs)     Types: Cigarettes     Start date: 1995    Smokeless tobacco: Never   Substance Use Topics    Alcohol use: Yes     Comment: occasionally    Drug use: Not Currently          Health Maintenance Due   Topic Date Due    Hepatitis C Screening  Never done    Cervical Cancer Screening  Never done    Lipid Panel  Never done     COVID-19 Vaccine (1) Never done    Pneumococcal Vaccines (Age 0-64) (1 - PCV) Never done    HIV Screening  Never done    TETANUS VACCINE  Never done    Mammogram  Never done    Colorectal Cancer Screening  Never done    Influenza Vaccine (1) Never done          Patient Care Team:  Kandis Hutchinson MD as PCP - General (Internal Medicine)      Review of Systems   Constitutional:  Positive for chills, fatigue and fever.   HENT:  Positive for congestion, postnasal drip, rhinorrhea, sinus pain, sneezing and sore throat. Negative for trouble swallowing.    Eyes:  Negative for redness and visual disturbance.   Respiratory:  Positive for cough and wheezing. Negative for chest tightness and shortness of breath.    Cardiovascular:  Negative for chest pain and palpitations.   Gastrointestinal:  Negative for abdominal pain, constipation, diarrhea, nausea and vomiting.   Genitourinary:  Negative for dysuria, flank pain, frequency and urgency.   Musculoskeletal:  Positive for joint pain. Negative for arthralgias, gait problem and myalgias.   Skin:  Negative for rash and wound.   Neurological:  Positive for headaches. Negative for facial asymmetry, speech difficulty and weakness.   All other systems reviewed and are negative.      Objective:   /78 (BP Location: Right arm, Patient Position: Sitting, BP Method: Small (Automatic))   Pulse 93   Temp (!) 100.7 °F (38.2 °C) (Temporal)   Resp 16   Wt 46.1 kg (101 lb 9.6 oz)   SpO2 96%   BMI 18.00 kg/m²      Physical Exam  Constitutional:       General: She is not in acute distress.     Appearance: Normal appearance.   HENT:      Right Ear: Tympanic membrane, ear canal and external ear normal.      Left Ear: Tympanic membrane, ear canal and external ear normal.      Nose: Nose normal.      Mouth/Throat:      Mouth: Mucous membranes are moist.      Pharynx: Oropharynx is clear. Posterior oropharyngeal erythema present. No oropharyngeal exudate.      Tonsils: No tonsillar  exudate.   Eyes:      Extraocular Movements: Extraocular movements intact.      Conjunctiva/sclera: Conjunctivae normal.      Pupils: Pupils are equal, round, and reactive to light.   Cardiovascular:      Rate and Rhythm: Normal rate and regular rhythm.      Pulses: Normal pulses.      Heart sounds: Normal heart sounds. No murmur heard.     No gallop.   Pulmonary:      Effort: Pulmonary effort is normal.      Breath sounds: Wheezing present.   Abdominal:      General: Bowel sounds are normal. There is no distension.      Palpations: Abdomen is soft. There is no mass.      Tenderness: There is no abdominal tenderness. There is no guarding.   Musculoskeletal:         General: Normal range of motion.   Skin:     General: Skin is warm and dry.   Neurological:      Mental Status: She is alert. Mental status is at baseline.      Sensory: No sensory deficit.      Motor: No weakness.           Assessment:       ICD-10-CM ICD-9-CM   1. Upper respiratory tract infection, unspecified type  J06.9 465.9   2. Sore throat  J02.9 462   3. Wheezing  R06.2 786.07   4. Cough, unspecified type  R05.9 786.2   5. Anxiety and depression  F41.9 300.00    F32.A 311   6. Attention deficit disorder (ADD) without hyperactivity  F98.8 314.00        Plan:     Problem List Items Addressed This Visit          Psychiatric    Anxiety and depression (Chronic)     -stable   -currently on Celexa 10 mg daily and Xanax 0.5 mg b.i.d. p.r.n., continue  -notify office for new or worsening symptoms         Attention deficit disorder (ADD) without hyperactivity (Chronic)     -currently on Adderall XR 10 mg daily, continue  -notify office for any worsening symptoms  -follow-up q.3 months for refills         Relevant Medications    dextroamphetamine-amphetamine (ADDERALL XR) 10 MG 24 hr capsule (Start on 2/24/2024)    dextroamphetamine-amphetamine (ADDERALL XR) 10 MG 24 hr capsule (Start on 1/26/2024)    dextroamphetamine-amphetamine (ADDERALL XR) 10 MG 24 hr  capsule       ENT    Upper respiratory tract infection - Primary     -obtain COVID/RSV/FLU PCR  -in office strep: Negative  -initiate Antihistamine  -initiate Flonase BID  -initiate Tessalon Perles p.r.n. cough   -initiate albuterol p.r.n. wheezing/shortness a breath  -Steam inhalation  -Tylenol/ibuprofen for body aches and low-grade temps  -okay to utilize Vit C , Vit D, and Zinc  -safety precautions/quarantine discussed   -notify clinic for any new or worsening symptoms           Relevant Medications    dexAMETHasone injection 4 mg (Completed)    benzonatate (TESSALON) 100 MG capsule    fluticasone propionate (FLONASE) 50 mcg/actuation nasal spray    loratadine (CLARITIN) 10 mg tablet    albuterol (PROAIR HFA) 90 mcg/actuation inhaler    Other Relevant Orders    COVID/RSV/FLU A&B PCR    POCT Strep A, Molecular (Completed)    Sore throat     -obtain COVID/RSV/FLU PCR  -in office strep: Negative  -Tylenol/ibuprofen for body aches and low-grade temps             Relevant Orders    POCT Strep A, Molecular (Completed)       Pulmonary    Cough     -obtain COVID/RSV/FLU PCR  -in office strep: Negative  -initiate Antihistamine  -initiate Flonase BID  -initiate Tessalon Perles p.r.n. cough   -initiate albuterol p.r.n. wheezing/shortness a breath  -Steam inhalation  -Tylenol/ibuprofen for body aches and low-grade temps  -okay to utilize Vit C , Vit D, and Zinc  -safety precautions/quarantine discussed   -notify clinic for any new or worsening symptoms           Relevant Medications    dexAMETHasone injection 4 mg (Completed)    benzonatate (TESSALON) 100 MG capsule    Other Relevant Orders    COVID/RSV/FLU A&B PCR    Wheezing     -obtain COVID/RSV/FLU PCR  -IM dexamethasone in office  -initiate Antihistamine  -initiate Flonase BID  -initiate albuterol p.r.n. wheezing/shortness a breath  -Steam inhalation  -okay to utilize Vit C , Vit D, and Zinc  -safety precautions/quarantine discussed   -notify clinic for any new or  worsening symptoms           Relevant Medications    dexAMETHasone injection 4 mg (Completed)    albuterol (PROAIR HFA) 90 mcg/actuation inhaler    Other Relevant Orders    COVID/RSV/FLU A&B PCR          Follow up in about 3 months (around 3/28/2024) for ADD, Present to ER/Urgent Care if symtoms worsen.   -plan specifics discussed above    Orders Placed This Encounter    COVID/RSV/FLU A&B PCR    POCT Strep A, Molecular    dexAMETHasone injection 4 mg    benzonatate (TESSALON) 100 MG capsule    fluticasone propionate (FLONASE) 50 mcg/actuation nasal spray    loratadine (CLARITIN) 10 mg tablet    dextroamphetamine-amphetamine (ADDERALL XR) 10 MG 24 hr capsule    dextroamphetamine-amphetamine (ADDERALL XR) 10 MG 24 hr capsule    dextroamphetamine-amphetamine (ADDERALL XR) 10 MG 24 hr capsule    albuterol (PROAIR HFA) 90 mcg/actuation inhaler        Medication List with Changes/Refills   New Medications    ALBUTEROL (PROAIR HFA) 90 MCG/ACTUATION INHALER    Inhale 2 puffs into the lungs every 6 (six) hours as needed for Wheezing. Rescue    BENZONATATE (TESSALON) 100 MG CAPSULE    Take 1 capsule (100 mg total) by mouth 3 (three) times daily as needed.    DEXTROAMPHETAMINE-AMPHETAMINE (ADDERALL XR) 10 MG 24 HR CAPSULE    Take 1 capsule (10 mg total) by mouth every morning.    DEXTROAMPHETAMINE-AMPHETAMINE (ADDERALL XR) 10 MG 24 HR CAPSULE    Take 1 capsule (10 mg total) by mouth every morning.    FLUTICASONE PROPIONATE (FLONASE) 50 MCG/ACTUATION NASAL SPRAY    2 sprays (100 mcg total) by Each Nostril route once daily. for 14 days    LORATADINE (CLARITIN) 10 MG TABLET    Take 1 tablet (10 mg total) by mouth once daily. for 14 days   Current Medications    ALPRAZOLAM (XANAX) 0.5 MG TABLET    Take 0.5 mg by mouth 2 (two) times daily as needed.    CITALOPRAM (CELEXA) 10 MG TABLET    Take 2 tablets (20 mg total) by mouth once daily.   Changed and/or Refilled Medications    Modified Medication Previous Medication     DEXTROAMPHETAMINE-AMPHETAMINE (ADDERALL XR) 10 MG 24 HR CAPSULE dextroamphetamine-amphetamine (ADDERALL XR) 10 MG 24 hr capsule       Take 1 capsule (10 mg total) by mouth every morning.    Take 1 capsule (10 mg total) by mouth every morning.   Discontinued Medications    GABAPENTIN (NEURONTIN) 100 MG CAPSULE    Take 1 capsule (100 mg total) by mouth 2 (two) times daily. for 14 days    VALACYCLOVIR (VALTREX) 1000 MG TABLET    Take 1 tablet (1,000 mg total) by mouth 3 (three) times daily. for 7 days

## 2023-12-28 NOTE — ASSESSMENT & PLAN NOTE
-stable   -currently on Celexa 10 mg daily and Xanax 0.5 mg b.i.d. p.r.n., continue  -notify office for new or worsening symptoms

## 2024-01-03 ENCOUNTER — TELEPHONE (OUTPATIENT)
Dept: INTERNAL MEDICINE | Facility: CLINIC | Age: 44
End: 2024-01-03
Payer: COMMERCIAL

## 2024-01-03 DIAGNOSIS — R05.9 COUGH, UNSPECIFIED TYPE: Primary | ICD-10-CM

## 2024-01-03 DIAGNOSIS — R06.2 WHEEZING: ICD-10-CM

## 2024-01-03 NOTE — TELEPHONE ENCOUNTER
Pt requesting an order for an Chest X-Ray. Pt complains she has very deep congested cough. The cough medication prescribed really is not helping. Please advise thank you  Sent letter for Pt work.   English

## 2024-03-05 DIAGNOSIS — F98.8 ATTENTION DEFICIT DISORDER (ADD) WITHOUT HYPERACTIVITY: Chronic | ICD-10-CM

## 2024-03-05 RX ORDER — DEXTROAMPHETAMINE SACCHARATE, AMPHETAMINE ASPARTATE MONOHYDRATE, DEXTROAMPHETAMINE SULFATE AND AMPHETAMINE SULFATE 2.5; 2.5; 2.5; 2.5 MG/1; MG/1; MG/1; MG/1
10 CAPSULE, EXTENDED RELEASE ORAL EVERY MORNING
Qty: 30 CAPSULE | Refills: 0 | Status: SHIPPED | OUTPATIENT
Start: 2024-03-05 | End: 2024-04-01

## 2024-03-05 NOTE — TELEPHONE ENCOUNTER
----- Message from Raleigh Jerez sent at 3/5/2024 12:55 PM CST -----  .Type:  RX Refill Request    Who Called: pt  Refill or New Rx:Refill  RX Name and Strength:dextroamphetamine-amphetamine (ADDERALL XR) 10 MG 24 hr capsule  How is the patient currently taking it? (ex. 1XDay):Route: Take 1 capsule (10 mg total) by mouth every morning. - Oral  Is this a 30 day or 90 day RX:30 day  Preferred Pharmacy with phone number:[facility] #[id number] [facility] [address] AT [facility] [address] [[id number]]  Local or Mail Order:local  Ordering Provider:  Would the patient rather a call back or a response via [facility]? Call back  Best Call Back Number:[phone]  Additional Information: original pharmacy is out of stock and pt has found a new pharmacy with it in stock and is requesting a new prescription to be sent to

## 2024-03-08 ENCOUNTER — TELEPHONE (OUTPATIENT)
Dept: INTERNAL MEDICINE | Facility: CLINIC | Age: 44
End: 2024-03-08
Payer: COMMERCIAL

## 2024-03-08 NOTE — TELEPHONE ENCOUNTER
----- Message from Raleigh Jerez sent at 3/8/2024  8:29 AM CST -----  .Type:  Needs Medical Advice    Who Called: pt  Symptoms (please be specific):    How long has patient had these symptoms:    Pharmacy name and phone #:    Would the patient rather a call back or a response via [facility]? Call back  Best Call Back Number: [phone]  Additional Information: pt called wanting to discuss issues with getting dextroamphetamine-amphetamine (ADDERALL XR) 10 MG 24 hr capsule filled

## 2024-03-18 NOTE — TELEPHONE ENCOUNTER
----- Message from Ssaha Oh sent at 3/18/2024  4:13 PM CDT -----  Regarding: advice  Type:  Needs Medical Advice    Who Called: pt    Best Call Back Number: [phone]  Additional Information: stated that she isn't able to get dextroamphetamine-amphetamine (ADDERALL XR) 10 MG 24 hr capsule  and wanted to know if she can switch to lisdexamfetamine (VYVANSE) 20 MG capsule but wanted to decrease to the 10 mg. Stated that she would like to speak to the nurse. Please advise

## 2024-03-19 RX ORDER — LISDEXAMFETAMINE DIMESYLATE CAPSULES 10 MG/1
10 CAPSULE ORAL EVERY MORNING
Qty: 30 CAPSULE | Refills: 0 | Status: SHIPPED | OUTPATIENT
Start: 2024-03-19 | End: 2024-04-01 | Stop reason: SDUPTHER

## 2024-04-01 ENCOUNTER — OFFICE VISIT (OUTPATIENT)
Dept: INTERNAL MEDICINE | Facility: CLINIC | Age: 44
End: 2024-04-01
Payer: COMMERCIAL

## 2024-04-01 ENCOUNTER — TELEPHONE (OUTPATIENT)
Dept: INTERNAL MEDICINE | Facility: CLINIC | Age: 44
End: 2024-04-01

## 2024-04-01 VITALS — SYSTOLIC BLOOD PRESSURE: 126 MMHG | DIASTOLIC BLOOD PRESSURE: 78 MMHG

## 2024-04-01 DIAGNOSIS — F41.9 ANXIETY AND DEPRESSION: Chronic | ICD-10-CM

## 2024-04-01 DIAGNOSIS — F98.8 ATTENTION DEFICIT DISORDER (ADD) WITHOUT HYPERACTIVITY: Primary | Chronic | ICD-10-CM

## 2024-04-01 DIAGNOSIS — F32.A ANXIETY AND DEPRESSION: Chronic | ICD-10-CM

## 2024-04-01 PROCEDURE — 3074F SYST BP LT 130 MM HG: CPT | Mod: CPTII,95,, | Performed by: INTERNAL MEDICINE

## 2024-04-01 PROCEDURE — 1159F MED LIST DOCD IN RCRD: CPT | Mod: CPTII,95,, | Performed by: INTERNAL MEDICINE

## 2024-04-01 PROCEDURE — 99214 OFFICE O/P EST MOD 30 MIN: CPT | Mod: 95,,, | Performed by: INTERNAL MEDICINE

## 2024-04-01 PROCEDURE — 3078F DIAST BP <80 MM HG: CPT | Mod: CPTII,95,, | Performed by: INTERNAL MEDICINE

## 2024-04-01 PROCEDURE — 1160F RVW MEDS BY RX/DR IN RCRD: CPT | Mod: CPTII,95,, | Performed by: INTERNAL MEDICINE

## 2024-04-01 RX ORDER — LISDEXAMFETAMINE DIMESYLATE CAPSULES 10 MG/1
10 CAPSULE ORAL EVERY MORNING
Qty: 30 CAPSULE | Refills: 0 | Status: SHIPPED | OUTPATIENT
Start: 2024-04-01 | End: 2024-04-15 | Stop reason: SDUPTHER

## 2024-04-01 NOTE — PROGRESS NOTES
Visit type: Virtual visit with synchronous audio and video    Total time spent with patient: 25 minutes  30 minutes of total time spent on the encounter, which includes face to face time and non-face to face time preparing to see the patient (eg, review of tests), obtaining and/or reviewing separately obtained history, documenting clinical information in the electronic or other health record, independently interpreting results (not separately reported) and communicating results to the patient/family/caregiver, or care coordination (not separately reported).    Each patient to whom he or she provides medical services by telemedicine is:  (1) informed of the relationship between the physician and patient and the respective role of any other health care provider with respect to management of the patient; and (2) notified that he or she may decline to receive medical services by telemedicine and may withdraw from such care at any time.     Patient and myself were both located in [place], [place]    CC:Attention Deficit Disorder  HPI:  Brittany is a 43-year-old female with ADD that is seen today for her three-month follow-up for ADD refills.  Does have some anxiety/depression and is on citalopram and seems to be doing well on the reduced dose of 20 mg p.o. daily.    Patient tried to switch to Adderall since it was a shortage of Vyvanse however it does not seem to work very well for her.  She would like to get started back on the low dose of Vyvanse and a prescription will be sent today.    Recently recovered from influenza and has been recuperating slowly with difficulty gaining the weight that she lost back.  Advised on healthy diet and exercise.  Also encouraged taking calcium plus vitamin-D supplementation and a multivitamin routinely.    Wellness:  2023   MM2023  CRS:  Follow-up at next visit  Problem List Items Addressed This Visit          Psychiatric    Anxiety and depression (Chronic)    Current  Assessment & Plan     -on Celexa 20 mg p.o. daily and Xanax 0.5 mg b.i.d. p.r.n., continue, doing well         Attention deficit disorder (ADD) without hyperactivity - Primary (Chronic)    Current Assessment & Plan     -doing better with the Vyvanse then with Adderall, would like to switch back   -does not want to look big dose of Vyvanse since it usually keeps her while during the evening times as well   -starting at a low dose of 10 mg, titrate as appropriate            The patient's Health Maintenance was reviewed and the following appears to be due at this time:  Health Maintenance Due   Topic Date Due    Hepatitis C Screening  Never done    Cervical Cancer Screening  Never done    Lipid Panel  Never done    Pneumococcal Vaccines (Age 0-64) (1 of 2 - PCV) Never done    HIV Screening  Never done    TETANUS VACCINE  Never done    Mammogram  Never done    Colorectal Cancer Screening  Never done    Influenza Vaccine (1) Never done    COVID-19 Vaccine (2 - 2023-24 season) 09/01/2023       [unfilled]  Outpatient Medications Prior to Visit   Medication Sig Dispense Refill    albuterol (PROAIR HFA) 90 mcg/actuation inhaler Inhale 2 puffs into the lungs every 6 (six) hours as needed for Wheezing. Rescue 18 g 0    ALPRAZolam (XANAX) 0.5 MG tablet Take 0.5 mg by mouth 2 (two) times daily as needed.      citalopram (CELEXA) 10 MG tablet Take 2 tablets (20 mg total) by mouth once daily. 60 tablet 2    fluticasone propionate (FLONASE) 50 mcg/actuation nasal spray SHAKE LIQUID AND USE 2 SPRAYS(100 MCG) IN EACH NOSTRIL EVERY DAY FOR 14 DAYS 48 g 3    loratadine (CLARITIN) 10 mg tablet Take 1 tablet (10 mg total) by mouth once daily. for 14 days 14 tablet 0    lisdexamfetamine (VYVANSE) 10 mg Cap Take 1 capsule (10 mg total) by mouth every morning. 30 capsule 0    dextroamphetamine-amphetamine (ADDERALL XR) 10 MG 24 hr capsule Take 1 capsule (10 mg total) by mouth every morning. (Patient not taking: Reported on 4/1/2024) 30  capsule 0     No facility-administered medications prior to visit.        Physical Exam   /78   LMP  (LMP Unknown)   Constitutional: The patient appears well-developed and well-nourished. No distress.      Psychiatric: The mood appears not anxious and the affect is not angry, not blunt, not labile and not inappropriate. Speech is not rapid and/or pressured, not delayed, not tangential and not slurred. The patient is not agitated, not aggressive, is not hyperactive, not slowed, not withdrawn, not actively hallucinating and not combative. Thought content is not paranoid and not delusional. Cognition and memory are not impaired. The patient does not express impulsivity or inappropriate judgment and does not exhibit a depressed mood. The patient expresses no homicidal and no suicidal ideation and has no suicidal plans and no homicidal plans. The patient is communicative and exhibits a normal recent memory and normal remote memory. The patient is attentive.     Encounter Diagnoses   Name Primary?    Attention deficit disorder (ADD) without hyperactivity Yes    Anxiety and depression        PLAN:    I have discontinued Brittany Hines's dextroamphetamine-amphetamine. I am also having her maintain her ALPRAZolam, citalopram, loratadine, albuterol, fluticasone propionate, and lisdexamfetamine.    Brittany was seen today for follow-up.    Diagnoses and all orders for this visit:    Attention deficit disorder (ADD) without hyperactivity    Anxiety and depression    Other orders  -     lisdexamfetamine (VYVANSE) 10 mg Cap; Take 1 capsule (10 mg total) by mouth every morning.        Medications Ordered This Encounter   Medications    lisdexamfetamine (VYVANSE) 10 mg Cap     Sig: Take 1 capsule (10 mg total) by mouth every morning.     Dispense:  30 capsule     Refill:  0     Medications Ordered This Encounter   Medications    lisdexamfetamine (VYVANSE) 10 mg Cap     Sig: Take 1 capsule (10 mg total) by mouth every  morning.     Dispense:  30 capsule     Refill:  0     No orders of the defined types were placed in this encounter.

## 2024-04-01 NOTE — ASSESSMENT & PLAN NOTE
-doing better with the Vyvanse then with Adderall, would like to switch back   -does not want to look big dose of Vyvanse since it usually keeps her while during the evening times as well   -starting at a low dose of 10 mg, titrate as appropriate

## 2024-04-01 NOTE — TELEPHONE ENCOUNTER
----- Message from Kandis Hutchinson MD sent at 4/1/2024  3:02 PM CDT -----  3 months, ADD, TeleMed okay

## 2024-04-02 ENCOUNTER — TELEPHONE (OUTPATIENT)
Dept: INTERNAL MEDICINE | Facility: CLINIC | Age: 44
End: 2024-04-02
Payer: COMMERCIAL

## 2024-04-02 DIAGNOSIS — R05.9 COUGH, UNSPECIFIED TYPE: Primary | ICD-10-CM

## 2024-04-02 RX ORDER — AZITHROMYCIN 250 MG/1
TABLET, FILM COATED ORAL
Qty: 6 TABLET | Refills: 0 | Status: SHIPPED | OUTPATIENT
Start: 2024-04-02

## 2024-04-02 NOTE — TELEPHONE ENCOUNTER
----- Message from Samantha Loya sent at 4/2/2024  2:30 PM CDT -----  Regarding: med  .Type:  Needs Medical Advice    Who Called: pt  Symptoms (please be specific): cold   How long has patient had these symptoms:  1wk  Pharmacy name and phone #:    Would the patient rather a call back or a response via [facility]? bronson  Best Call Back Number:  [phone]  Additional Information: pt request med call in

## 2024-04-04 ENCOUNTER — TELEPHONE (OUTPATIENT)
Dept: INTERNAL MEDICINE | Facility: CLINIC | Age: 44
End: 2024-04-04
Payer: COMMERCIAL

## 2024-04-15 ENCOUNTER — TELEPHONE (OUTPATIENT)
Dept: INTERNAL MEDICINE | Facility: CLINIC | Age: 44
End: 2024-04-15
Payer: COMMERCIAL

## 2024-04-15 DIAGNOSIS — F98.8 ATTENTION DEFICIT DISORDER (ADD) WITHOUT HYPERACTIVITY: Primary | Chronic | ICD-10-CM

## 2024-04-15 DIAGNOSIS — F41.9 ANXIETY AND DEPRESSION: Primary | Chronic | ICD-10-CM

## 2024-04-15 DIAGNOSIS — F32.A ANXIETY AND DEPRESSION: Primary | Chronic | ICD-10-CM

## 2024-04-15 DIAGNOSIS — F98.8 ATTENTION DEFICIT DISORDER (ADD) WITHOUT HYPERACTIVITY: Chronic | ICD-10-CM

## 2024-04-15 RX ORDER — LISDEXAMFETAMINE DIMESYLATE CAPSULES 10 MG/1
10 CAPSULE ORAL EVERY MORNING
Qty: 30 CAPSULE | Refills: 0 | Status: SHIPPED | OUTPATIENT
Start: 2024-04-15

## 2024-04-15 RX ORDER — CITALOPRAM 20 MG/1
20 TABLET, FILM COATED ORAL DAILY
Qty: 30 TABLET | Refills: 11 | Status: SHIPPED | OUTPATIENT
Start: 2024-04-15 | End: 2024-04-15

## 2024-04-15 RX ORDER — CITALOPRAM 20 MG/1
20 TABLET, FILM COATED ORAL DAILY
Qty: 30 TABLET | Refills: 11 | Status: SHIPPED | OUTPATIENT
Start: 2024-04-15 | End: 2025-04-15

## 2024-04-17 ENCOUNTER — TELEPHONE (OUTPATIENT)
Dept: INTERNAL MEDICINE | Facility: CLINIC | Age: 44
End: 2024-04-17
Payer: COMMERCIAL

## 2024-04-17 DIAGNOSIS — F98.8 ATTENTION DEFICIT DISORDER (ADD) WITHOUT HYPERACTIVITY: Primary | ICD-10-CM

## 2024-04-17 RX ORDER — DEXTROAMPHETAMINE SACCHARATE, AMPHETAMINE ASPARTATE MONOHYDRATE, DEXTROAMPHETAMINE SULFATE AND AMPHETAMINE SULFATE 2.5; 2.5; 2.5; 2.5 MG/1; MG/1; MG/1; MG/1
10 CAPSULE, EXTENDED RELEASE ORAL EVERY MORNING
Qty: 30 CAPSULE | Refills: 0 | Status: SHIPPED | OUTPATIENT
Start: 2024-04-17 | End: 2024-05-23

## 2024-04-17 NOTE — TELEPHONE ENCOUNTER
----- Message from Sasha Oh sent at 4/17/2024  8:46 AM CDT -----  Regarding: advice  Type:  Needs Medical Advice    Who Called: pt    Best Call Back Number: [phone]  Additional Information: stated that she is needing to change back to the dextroamphetamine-amphetamine (ADDERALL XR) 10 MG 24 hr capsule being that the vyvanse is too expensive. Please advise

## 2024-04-24 DIAGNOSIS — F32.A ANXIETY AND DEPRESSION: Chronic | ICD-10-CM

## 2024-04-24 DIAGNOSIS — F41.9 ANXIETY AND DEPRESSION: Chronic | ICD-10-CM

## 2024-04-24 RX ORDER — ALPRAZOLAM 0.5 MG/1
0.5 TABLET ORAL 2 TIMES DAILY PRN
Qty: 60 TABLET | Refills: 3 | Status: SHIPPED | OUTPATIENT
Start: 2024-04-24

## 2024-04-24 NOTE — TELEPHONE ENCOUNTER
----- Message from Carlie Díaz sent at 4/24/2024  8:57 AM CDT -----  Regarding: refill  Type:  RX Refill Request    Who Called: Brittany    Refill or New Rx:refill    RX Name and Strength:ALPRAZolam (XANAX) 0.5 MG tablet    How is the patient currently taking it? (ex. 1XDay):    Is this a 30 day or 90 day RX:    Preferred Pharmacy with phone number:[facility] [address]    Local or Mail Order:local    Ordering Provider:    Would the patient rather a call back or a response via [facility]?     Best Call Back Number:    Additional Information:

## 2024-05-22 ENCOUNTER — TELEPHONE (OUTPATIENT)
Dept: INTERNAL MEDICINE | Facility: CLINIC | Age: 44
End: 2024-05-22
Payer: COMMERCIAL

## 2024-05-22 NOTE — TELEPHONE ENCOUNTER
----- Message from Maria R Barton sent at 5/22/2024  4:33 PM CDT -----  .Who Called: Brittany Hines    Refill or New Rx:refill  RX Name and Strength:dextroamphetamine-amphetamine (ADDERALL XR) 10 MG 24 hr capsule  How is the patient currently taking it? (ex. 1XDay):1x day  Is this a 30 day or 90 day RX:30  Local or Mail Order:local  List of preferred pharmacies on file (remove unneeded): [unfilled]  If different Pharmacy is requested, enter Pharmacy information here including location and phone number: [facility]   Ordering Provider:Evangelina      Preferred Method of Contact: Phone Call  Patient's Preferred Phone Number on File: [phone]   Best Call Back Number, if different:  Additional Information: pt wants increase dose as pcp recommended due to storage pt ask for call back

## 2024-05-23 RX ORDER — DEXTROAMPHETAMINE SACCHARATE, AMPHETAMINE ASPARTATE MONOHYDRATE, DEXTROAMPHETAMINE SULFATE AND AMPHETAMINE SULFATE 5; 5; 5; 5 MG/1; MG/1; MG/1; MG/1
20 CAPSULE, EXTENDED RELEASE ORAL EVERY MORNING
Qty: 30 CAPSULE | Refills: 0 | Status: SHIPPED | OUTPATIENT
Start: 2024-05-23

## 2024-05-23 NOTE — TELEPHONE ENCOUNTER
Pt requesting increase to adderall 20mg as discussed in last appt, not working on 10mg ER and medication not easily accessible, please advise

## 2024-06-24 DIAGNOSIS — F98.8 ATTENTION DEFICIT DISORDER (ADD) WITHOUT HYPERACTIVITY: Primary | ICD-10-CM

## 2024-06-24 RX ORDER — DEXTROAMPHETAMINE SACCHARATE, AMPHETAMINE ASPARTATE MONOHYDRATE, DEXTROAMPHETAMINE SULFATE AND AMPHETAMINE SULFATE 5; 5; 5; 5 MG/1; MG/1; MG/1; MG/1
20 CAPSULE, EXTENDED RELEASE ORAL EVERY MORNING
Qty: 30 CAPSULE | Refills: 0 | Status: SHIPPED | OUTPATIENT
Start: 2024-06-24

## 2024-06-24 NOTE — TELEPHONE ENCOUNTER
----- Message from Karol Chang sent at 6/24/2024  1:18 PM CDT -----  Type:  RX Refill Request    Who Called: pt  Refill or New Rx:refill    RX Name and Strength:dextroamphetamine-amphetamine (ADDERALL XR) 20 MG 24 hr capsule  How is the patient currently taking it? (ex. 1XDay):  Is this a 30 day or 90 day RX:    Preferred Pharmacy with phone number:[id number] [facility] [place] [facility] [address] AT [facility] [address]  Local or Mail Order:  Ordering Provider:  Would the patient rather a call back or a response via [facility]?   Best Call Back Number:  Additional Information:

## 2024-06-28 ENCOUNTER — TELEPHONE (OUTPATIENT)
Dept: INTERNAL MEDICINE | Facility: CLINIC | Age: 44
End: 2024-06-28
Payer: COMMERCIAL

## 2024-06-28 DIAGNOSIS — F98.8 ATTENTION DEFICIT DISORDER (ADD) WITHOUT HYPERACTIVITY: Primary | ICD-10-CM

## 2024-06-28 RX ORDER — DEXTROAMPHETAMINE SACCHARATE, AMPHETAMINE ASPARTATE MONOHYDRATE, DEXTROAMPHETAMINE SULFATE AND AMPHETAMINE SULFATE 2.5; 2.5; 2.5; 2.5 MG/1; MG/1; MG/1; MG/1
10 CAPSULE, EXTENDED RELEASE ORAL EVERY MORNING
Qty: 30 CAPSULE | Refills: 0 | Status: SHIPPED | OUTPATIENT
Start: 2024-06-28

## 2024-06-28 NOTE — TELEPHONE ENCOUNTER
----- Message from Yee Payan sent at 6/28/2024  9:59 AM CDT -----  Regarding: Refill Request  .Who Called: Brittany Hines    Refill or New Rx:Refill  RX Name and Strength:dextroamphetamine-amphetamine (ADDERALL XR) 10 MG 24 hr capsule  How is the patient currently taking it? (ex. 1XDay):1x  Is this a 30 day or 90 day RX:30  Local or Mail Order:local   List of preferred pharmacies on file (remove unneeded): [facility]  If different Pharmacy is requested, enter Pharmacy information here including location and phone number: [id number] - [place] [place] - [address] AT [facility] [address]   Ordering Provider:MAXIMILIANO Hutchinson      Preferred Method of Contact: Phone Call  Patient's Preferred Phone Number on File: [phone]   Best Call Back Number, if different:  Additional Information: pt is requesting her prescription be resubmitted to the pharmacy due to the pharmacy is unable to get the 20 mg so she asking for the 10 mg with a dosage of once daily, please call because she's traveling out the country on tomorrow, please call

## 2024-06-28 NOTE — TELEPHONE ENCOUNTER
----- Message from Yee Payan sent at 6/28/2024  9:59 AM CDT -----  Regarding: Refill Request  .Who Called: Brittany Hines    Refill or New Rx:Refill  RX Name and Strength:dextroamphetamine-amphetamine (ADDERALL XR) 10 MG 24 hr capsule  How is the patient currently taking it? (ex. 1XDay):1x  Is this a 30 day or 90 day RX:30  Local or Mail Order:local   List of preferred pharmacies on file (remove unneeded): [facility]  If different Pharmacy is requested, enter Pharmacy information here including location and phone number: [id number] - [place], [place] - [address] AT [facility] [address]   Ordering Provider:MAXIMILIANO Hutchinson      Preferred Method of Contact: Phone Call  Patient's Preferred Phone Number on File: [phone]   Best Call Back Number, if different:  Additional Information: pt is requesting her prescription be resubmitted to the pharmacy due to the pharmacy is unable to get the 20 mg so she asking for the 10 mg with a dosage of once daily, please call because she's traveling out the country on tomorrow, please call

## 2024-08-08 ENCOUNTER — PATIENT OUTREACH (OUTPATIENT)
Facility: CLINIC | Age: 44
End: 2024-08-08
Payer: COMMERCIAL

## 2024-08-08 ENCOUNTER — TELEPHONE (OUTPATIENT)
Dept: INTERNAL MEDICINE | Facility: CLINIC | Age: 44
End: 2024-08-08
Payer: COMMERCIAL

## 2024-08-12 ENCOUNTER — TELEPHONE (OUTPATIENT)
Dept: INTERNAL MEDICINE | Facility: CLINIC | Age: 44
End: 2024-08-12
Payer: COMMERCIAL

## 2024-09-13 DIAGNOSIS — F32.A ANXIETY AND DEPRESSION: Chronic | ICD-10-CM

## 2024-09-13 DIAGNOSIS — F41.9 ANXIETY AND DEPRESSION: Chronic | ICD-10-CM

## 2024-09-16 RX ORDER — ALPRAZOLAM 0.5 MG/1
0.5 TABLET ORAL 2 TIMES DAILY PRN
Qty: 60 TABLET | Refills: 0 | Status: SHIPPED | OUTPATIENT
Start: 2024-09-16

## 2024-10-09 DIAGNOSIS — F41.9 ANXIETY AND DEPRESSION: Chronic | ICD-10-CM

## 2024-10-09 DIAGNOSIS — F32.A ANXIETY AND DEPRESSION: Chronic | ICD-10-CM

## 2024-10-09 RX ORDER — CITALOPRAM 10 MG/1
TABLET ORAL
Qty: 60 TABLET | Refills: 2 | Status: SHIPPED | OUTPATIENT
Start: 2024-10-09

## 2024-10-21 DIAGNOSIS — F32.A ANXIETY AND DEPRESSION: Chronic | ICD-10-CM

## 2024-10-21 DIAGNOSIS — F41.9 ANXIETY AND DEPRESSION: Chronic | ICD-10-CM

## 2024-10-21 RX ORDER — ALPRAZOLAM 0.5 MG/1
0.5 TABLET ORAL 2 TIMES DAILY PRN
Qty: 60 TABLET | Refills: 0 | Status: SHIPPED | OUTPATIENT
Start: 2024-10-21

## 2024-11-25 DIAGNOSIS — F32.A ANXIETY AND DEPRESSION: Chronic | ICD-10-CM

## 2024-11-25 DIAGNOSIS — F41.9 ANXIETY AND DEPRESSION: Chronic | ICD-10-CM

## 2024-11-25 RX ORDER — ALPRAZOLAM 0.5 MG/1
0.5 TABLET ORAL 2 TIMES DAILY PRN
Qty: 60 TABLET | Refills: 0 | Status: SHIPPED | OUTPATIENT
Start: 2024-11-25

## 2024-12-27 DIAGNOSIS — F41.9 ANXIETY AND DEPRESSION: Chronic | ICD-10-CM

## 2024-12-27 DIAGNOSIS — F98.8 ATTENTION DEFICIT DISORDER (ADD) WITHOUT HYPERACTIVITY: Primary | ICD-10-CM

## 2024-12-27 DIAGNOSIS — F32.A ANXIETY AND DEPRESSION: Chronic | ICD-10-CM

## 2024-12-27 RX ORDER — DEXTROAMPHETAMINE SACCHARATE, AMPHETAMINE ASPARTATE MONOHYDRATE, DEXTROAMPHETAMINE SULFATE AND AMPHETAMINE SULFATE 5; 5; 5; 5 MG/1; MG/1; MG/1; MG/1
20 CAPSULE, EXTENDED RELEASE ORAL EVERY MORNING
Qty: 10 CAPSULE | Refills: 0 | Status: SHIPPED | OUTPATIENT
Start: 2024-12-27 | End: 2025-01-06

## 2024-12-27 RX ORDER — ALPRAZOLAM 0.5 MG/1
0.5 TABLET ORAL 2 TIMES DAILY PRN
Qty: 20 TABLET | Refills: 0 | Status: SHIPPED | OUTPATIENT
Start: 2024-12-27

## 2024-12-27 NOTE — TELEPHONE ENCOUNTER
----- Message from Maria R sent at 12/27/2024  9:57 AM CST -----  .Who Called: Brittany Hines        Preferred Method of Contact: Phone Call  Patient's Preferred Phone Number on File: [phone]   Best Call Back Number, if different:  Additional Information: ALPRAZolam (XANAX) 0.5 MG tablet pt called and also pharmacy sent request as well for pt refills

## 2024-12-27 NOTE — TELEPHONE ENCOUNTER
1. Attention deficit disorder (ADD) without hyperactivity  - dextroamphetamine-amphetamine (ADDERALL XR) 20 MG 24 hr capsule; Take 1 capsule (20 mg total) by mouth every morning. for 10 days  Dispense: 10 capsule; Refill: 0    2. Anxiety and depression  - ALPRAZolam (XANAX) 0.5 MG tablet; Take 1 tablet (0.5 mg total) by mouth 2 (two) times daily as needed for Anxiety.  Dispense: 20 tablet; Refill: 0

## 2024-12-30 ENCOUNTER — TELEPHONE (OUTPATIENT)
Dept: INTERNAL MEDICINE | Facility: CLINIC | Age: 44
End: 2024-12-30
Payer: COMMERCIAL

## 2024-12-30 NOTE — TELEPHONE ENCOUNTER
ARE THERE ANY OUTSTANDING TASKS IN THE CHART? NO LAB NEEDED    IS THERE ANY DOCUMENTATION OF TASKS? NO    HAS PATIENT SEEN ANOTHER PHYSICIAN, BEEN TO THE ER, UCC, OR ADMITTED TO HOSPITAL SINCE LAST VISIT?    HAS THE PATIENT DONE BLOOD WORK OR IMAGING SINCE LAST VISIT?    PLEASE HAVE PATIENT BRING A LIST OF MEDICATIONS TO APPT

## 2025-01-06 ENCOUNTER — PATIENT MESSAGE (OUTPATIENT)
Dept: INTERNAL MEDICINE | Facility: CLINIC | Age: 45
End: 2025-01-06
Payer: COMMERCIAL

## 2025-01-06 ENCOUNTER — OFFICE VISIT (OUTPATIENT)
Dept: INTERNAL MEDICINE | Facility: CLINIC | Age: 45
End: 2025-01-06
Payer: COMMERCIAL

## 2025-01-06 VITALS — BODY MASS INDEX: 19.49 KG/M2 | WEIGHT: 110 LBS | HEIGHT: 63 IN

## 2025-01-06 DIAGNOSIS — F98.8 ATTENTION DEFICIT DISORDER (ADD) WITHOUT HYPERACTIVITY: Primary | ICD-10-CM

## 2025-01-06 DIAGNOSIS — F32.A ANXIETY AND DEPRESSION: Chronic | ICD-10-CM

## 2025-01-06 DIAGNOSIS — F41.9 ANXIETY AND DEPRESSION: Chronic | ICD-10-CM

## 2025-01-06 RX ORDER — ALPRAZOLAM 0.5 MG/1
0.5 TABLET ORAL 2 TIMES DAILY PRN
Qty: 20 TABLET | Refills: 0 | Status: SHIPPED | OUTPATIENT
Start: 2025-01-06

## 2025-01-06 RX ORDER — DEXTROAMPHETAMINE SACCHARATE, AMPHETAMINE ASPARTATE MONOHYDRATE, DEXTROAMPHETAMINE SULFATE AND AMPHETAMINE SULFATE 5; 5; 5; 5 MG/1; MG/1; MG/1; MG/1
20 CAPSULE, EXTENDED RELEASE ORAL EVERY MORNING
Qty: 30 CAPSULE | Refills: 0 | Status: SHIPPED | OUTPATIENT
Start: 2025-01-06 | End: 2025-02-05

## 2025-01-06 RX ORDER — CITALOPRAM 20 MG/1
20 TABLET, FILM COATED ORAL DAILY
Qty: 30 TABLET | Refills: 11 | Status: SHIPPED | OUTPATIENT
Start: 2025-01-06 | End: 2026-01-06

## 2025-01-06 NOTE — PROGRESS NOTES
Patient ID: [id number]     Chief Complaint: Follow-up (Medication refill)      HPI:     This is a telemedicine note. Patient was treated using telemedicine, real time audio and video, according to [facility] protocols. Andree ARMSTRONG, conducted the visit from the [facility]. The patient participated in the visit at a non-[facility] location selected by the patient, identified below. I am licensed in the state where the patient stated they are located. The patient stated that they understood and accepted the privacy and security risks to their information at their location. This visit is not recorded.    Patient was located at the patient's home.       Brittany Hines is a 44 y.o. female here today for a telemedicine visit for ADD/JANICE recheck. She is doing well on periodic dosing of Adderall a few times per week, mainly used with inattentive issues during work week. Slight inc in anxiety and depressive issues with the holiday and recent loss of father.  Denies CP, palpitations, SOB, weight loss, or issues with sleep.        -------------------------------------    Attention deficit disorder (ADD) without hyperactivity        Past Surgical History:   Procedure Laterality Date    BREAST SURGERY  05/31/2004    Augmentation    COSMETIC SURGERY  05/31/2004    NO PAST SURGERIES         Review of patient's allergies indicates:  No Known Allergies    Outpatient Medications Marked as Taking for the 1/6/25 encounter (Office Visit) with Andree Gore NP   Medication Sig Dispense Refill    [DISCONTINUED] ALPRAZolam (XANAX) 0.5 MG tablet Take 1 tablet (0.5 mg total) by mouth 2 (two) times daily as needed for Anxiety. 20 tablet 0    [DISCONTINUED] citalopram (CELEXA) 20 MG tablet Take 1 tablet (20 mg total) by mouth once daily. 30 tablet 11    [DISCONTINUED] dextroamphetamine-amphetamine (ADDERALL XR) 20 MG 24 hr capsule Take 1 capsule (20 mg total) by mouth every morning. for 10 days 10 capsule 0        Social History     Socioeconomic History    Marital status: Single   Tobacco Use    Smoking status: Every Day     Current packs/day: 1.00     Average packs/day: 1 pack/day for 30.0 years (30.0 ttl pk-yrs)     Types: Cigarettes     Start date: 1995    Smokeless tobacco: Never   Substance and Sexual Activity    Alcohol use: Yes     Alcohol/week: 5.0 standard drinks of alcohol     Types: 5 Drinks containing 0.5 oz of alcohol per week     Comment: occasionally    Drug use: Not Currently    Sexual activity: Not Currently     Partners: Male     Birth control/protection: None     Social Drivers of Health     Financial Resource Strain: Medium Risk (1/5/2025)    Overall Financial Resource Strain (CARDIA)     Difficulty of Paying Living Expenses: Somewhat hard   Food Insecurity: Food Insecurity Present (1/5/2025)    Hunger Vital Sign     Worried About Running Out of Food in the Last Year: Sometimes true     Ran Out of Food in the Last Year: Patient declined   Transportation Needs: No Transportation Needs (4/1/2024)    PRAPARE - Transportation     Lack of Transportation (Medical): No     Lack of Transportation (Non-Medical): No   Physical Activity: Sufficiently Active (1/5/2025)    Exercise Vital Sign     Days of Exercise per Week: 4 days     Minutes of Exercise per Session: 60 min   Stress: No Stress Concern Present (1/5/2025)    [place] citizen [employer] - Occupational Stress Questionnaire     Feeling of Stress : Only a little   Housing Stability: Low Risk  (4/1/2024)    Housing Stability Vital Sign     Unable to Pay for Housing in the Last Year: No     Number of Places Lived in the Last Year: 1     Unstable Housing in the Last Year: No        Family History   Problem Relation Name Age of Onset    Heart disease Mother Maria Hernandez     Heart disease Father Anurag Hines     Learning disabilities Father Anurag Hines     Heart disease Maternal Grandmother Renetta Fernandes     Heart disease Maternal  "Grandfather Carlyle Fernandes         Patient Care Team:  Kandis Hutchinson MD as PCP - General (Internal Medicine)  Brittany Nash LPN as Care Coordinator      Subjective:       Review of Systems   HENT:  Negative for hearing loss.    Eyes:  Negative for discharge.   Respiratory:  Negative for wheezing.    Cardiovascular:  Negative for chest pain and palpitations.   Gastrointestinal:  Negative for blood in stool, constipation, diarrhea and vomiting.   Genitourinary:  Negative for dysuria and hematuria.   Musculoskeletal:  Negative for neck pain.   Neurological:  Positive for headaches. Negative for weakness.   Endo/Heme/Allergies:  Negative for polydipsia.     Answers submitted by the patient for this visit:  Review of Systems Questionnaire (Submitted on 1/5/2025)  activity change: No  unexpected weight change: No  rhinorrhea: No  trouble swallowing: No  visual disturbance: No  chest tightness: No  polyuria: No  difficulty urinating: No  menstrual problem: No  joint swelling: No  arthralgias: No  confusion: No  dysphoric mood: No    See HPI for details    All Other ROS: Negative except as stated in HPI.       Objective:     Ht 5' 3" (1.6 m)   Wt 49.9 kg (110 lb)   BMI 19.49 kg/m²     Physical Exam    Physical Exam: LIMITED DUE TO TELEMEDICINE RESTRICTIONS.  General: Alert and oriented, No acute distress.  Head: Normocephalic, Atraumatic.  Eye: Sclera non-icteric.  Neck/Thyroid:  Full range of motion.  Respiratory: Non-labored respirations, Symmetrical chest wall expansion.  Musculoskeletal: Normal range of motion.  Integumentary: Warm, Dry, Intact, No visible suspicious lesions or rashes. No diaphoresis.   Neurologic: No focal deficits  Psychiatric: Normal interaction, Coherent speech, Euthymic mood, Appropriate affect       Assessment:       ICD-10-CM ICD-9-CM   1. Attention deficit disorder (ADD) without hyperactivity  F98.8 314.00   2. Anxiety and depression  F41.9 300.00    F32.A 311        Plan:     1. " Attention deficit disorder (ADD) without hyperactivity  Refilled x 1 month  Follow up in office in 3 months and as needed    - dextroamphetamine-amphetamine (ADDERALL XR) 20 MG 24 hr capsule; Take 1 capsule (20 mg total) by mouth every morning.  Dispense: 30 capsule; Refill: 0    2. Anxiety and depression  Refilled Xanax   Advised to avoid ETOH use with medication  Stable on current dosage of Celexa 20mg daily  Continue current RX regimen    - ALPRAZolam (XANAX) 0.5 MG tablet; Take 1 tablet (0.5 mg total) by mouth 2 (two) times daily as needed for Anxiety.  Dispense: 20 tablet; Refill: 0  - citalopram (CELEXA) 20 MG tablet; Take 1 tablet (20 mg total) by mouth once daily.  Dispense: 30 tablet; Refill: 11        Medication List with Changes/Refills   Changed and/or Refilled Medications    Modified Medication Previous Medication    ALPRAZOLAM (XANAX) 0.5 MG TABLET ALPRAZolam (XANAX) 0.5 MG tablet       Take 1 tablet (0.5 mg total) by mouth 2 (two) times daily as needed for Anxiety.    Take 1 tablet (0.5 mg total) by mouth 2 (two) times daily as needed for Anxiety.       Start Date: 1/6/2025  End Date: --    Start Date: 12/27/2024End Date: 1/6/2025    CITALOPRAM (CELEXA) 20 MG TABLET citalopram (CELEXA) 20 MG tablet       Take 1 tablet (20 mg total) by mouth once daily.    Take 1 tablet (20 mg total) by mouth once daily.       Start Date: 1/6/2025  End Date: 1/6/2026    Start Date: 4/15/2024 End Date: 1/6/2025    DEXTROAMPHETAMINE-AMPHETAMINE (ADDERALL XR) 20 MG 24 HR CAPSULE dextroamphetamine-amphetamine (ADDERALL XR) 20 MG 24 hr capsule       Take 1 capsule (20 mg total) by mouth every morning.    Take 1 capsule (20 mg total) by mouth every morning. for 10 days       Start Date: 1/6/2025  End Date: 2/5/2025    Start Date: 12/27/2024End Date: 1/6/2025   Discontinued Medications    ALBUTEROL (PROAIR HFA) 90 MCG/ACTUATION INHALER    Inhale 2 puffs into the lungs every 6 (six) hours as needed for Wheezing. Rescue        Start Date: 12/28/2023End Date: 1/6/2025    AZITHROMYCIN (Z-MITCHELL) 250 MG TABLET    Take 2 tablets by mouth on day 1; Take 1 tablet by mouth on days 2-5       Start Date: 4/2/2024  End Date: 1/6/2025    CITALOPRAM (CELEXA) 10 MG TABLET    TAKE 2 TABLETS(20 MG) BY MOUTH EVERY DAY       Start Date: 10/9/2024 End Date: 1/6/2025    FLUTICASONE PROPIONATE (FLONASE) 50 MCG/ACTUATION NASAL SPRAY    SHAKE LIQUID AND USE 2 SPRAYS(100 MCG) IN EACH NOSTRIL EVERY DAY FOR 14 DAYS       Start Date: 12/28/2023End Date: 1/6/2025    LISDEXAMFETAMINE (VYVANSE) 10 MG CAP    Take 1 capsule (10 mg total) by mouth every morning.       Start Date: 4/15/2024 End Date: 1/6/2025    LORATADINE (CLARITIN) 10 MG TABLET    Take 1 tablet (10 mg total) by mouth once daily. for 14 days       Start Date: 12/28/2023End Date: 1/6/2025          Follow up in about 3 months (around 4/6/2025) for With Dr. Hutchinson. In addition to their scheduled follow up, the patient has also been instructed to follow up on as needed basis.       Video Time Documentation:  Spent 10 minutes with patient face to face discussed health concerns. More than 50% of this time was spent in counseling and coordination of care.

## 2025-01-29 DIAGNOSIS — F32.A ANXIETY AND DEPRESSION: Chronic | ICD-10-CM

## 2025-01-29 DIAGNOSIS — F41.9 ANXIETY AND DEPRESSION: Chronic | ICD-10-CM

## 2025-01-30 RX ORDER — ALPRAZOLAM 0.5 MG/1
0.5 TABLET ORAL 2 TIMES DAILY PRN
Qty: 20 TABLET | Refills: 0 | Status: SHIPPED | OUTPATIENT
Start: 2025-01-30

## 2025-02-05 DIAGNOSIS — F41.9 ANXIETY AND DEPRESSION: Chronic | ICD-10-CM

## 2025-02-05 DIAGNOSIS — F32.A ANXIETY AND DEPRESSION: Chronic | ICD-10-CM

## 2025-02-05 RX ORDER — ALPRAZOLAM 0.5 MG/1
0.5 TABLET ORAL 2 TIMES DAILY PRN
Qty: 60 TABLET | Refills: 0 | Status: SHIPPED | OUTPATIENT
Start: 2025-02-05

## 2025-02-05 NOTE — TELEPHONE ENCOUNTER
----- Message from Alena sent at 2/5/2025  1:43 PM CST -----  Who Called: Brittany Sara Johnson    Caller is requesting assistance/information from provider's office.    Symptoms (please be specific): n/a   How long has patient had these symptoms:  n/a  List of preferred pharmacies on file (remove unneeded): [unfilled]  If different, enter pharmacy into here including location and phone number: n/a      Preferred Method of Contact: Phone Call  Patient's Preferred Phone Number on File: [phone]   Best Call Back Number, if different:  Additional Information: medical advice, pt called to discuss medication: ALPRAZolam (XANAX) 0.5 MG tablet 20 tablet, pt stated she did not received to correct qty of 60 on medication only received 20 tablets on last Rx refill

## 2025-02-20 DIAGNOSIS — F98.8 ATTENTION DEFICIT DISORDER (ADD) WITHOUT HYPERACTIVITY: ICD-10-CM

## 2025-02-20 RX ORDER — DEXTROAMPHETAMINE SACCHARATE, AMPHETAMINE ASPARTATE MONOHYDRATE, DEXTROAMPHETAMINE SULFATE AND AMPHETAMINE SULFATE 5; 5; 5; 5 MG/1; MG/1; MG/1; MG/1
20 CAPSULE, EXTENDED RELEASE ORAL EVERY MORNING
Qty: 30 CAPSULE | Refills: 0 | Status: SHIPPED | OUTPATIENT
Start: 2025-02-20 | End: 2025-03-22

## 2025-02-20 NOTE — TELEPHONE ENCOUNTER
----- Message from Liseth sent at 2/20/2025  1:31 PM CST -----  Regarding: refill  Who Called: Brittany HinesRefill or New Rx:RefillRX Name and Strength:dextroamphetamine-amphetamine (ADDERALL XR) 20 MG 24 hr capsuleHow is the patient currently taking it? (ex. 1XDay):1x dayIs this a 30 day or 90 day RX:30Local or Mail Order:localList of preferred pharmacies on file (remove unneeded): [unfilled]If different Pharmacy is requested, enter Pharmacy information here including location and phone number: [id number] - [place], [place] - [address] AT [facility] [address] Ordering Provider:Preferred Method of Contact: Phone CallPatient's Preferred Phone Number on File: [phone] Best Call Back Number, if different:Additional Information:

## 2025-03-20 ENCOUNTER — PATIENT MESSAGE (OUTPATIENT)
Facility: CLINIC | Age: 45
End: 2025-03-20

## 2025-03-26 DIAGNOSIS — F41.9 ANXIETY AND DEPRESSION: Chronic | ICD-10-CM

## 2025-03-26 DIAGNOSIS — F32.A ANXIETY AND DEPRESSION: Chronic | ICD-10-CM

## 2025-03-27 RX ORDER — ALPRAZOLAM 0.5 MG/1
0.5 TABLET ORAL 2 TIMES DAILY PRN
Qty: 60 TABLET | Refills: 0 | Status: SHIPPED | OUTPATIENT
Start: 2025-03-27

## 2025-04-10 ENCOUNTER — TELEPHONE (OUTPATIENT)
Dept: INTERNAL MEDICINE | Facility: CLINIC | Age: 45
End: 2025-04-10

## 2025-04-10 NOTE — TELEPHONE ENCOUNTER
Copied from CRM #[id number]. Topic: Medications - Medication Refill  >> Apr 10, 2025 10:39 AM Karol wrote:  Who Called: Brittany Hines    Refill or New Rx:Refill    RX Name and Strength:dextroamphetamine-amphetamine (ADDERALL XR) 20 MG 24 hr capsule  How is the patient currently taking it? (ex. 1XDay):1x  Is this a 30 day or 90 day RX:  Local or Mail Order:  List of preferred pharmacies on file (remove unneeded): [unfilled]  If different Pharmacy is requested, enter Pharmacy information here including location and phone number: [id number] - [place], [place] - [address] AT [facility] & [address]     Ordering Provider:        Patient's Preferred Phone Number on File: [phone]   Best Call Back Number, if different:  Additional Information:

## 2025-04-10 NOTE — TELEPHONE ENCOUNTER
Left detailed message for patient to set up appointment for refills. ADD medication requires a visit every 3 months. I sent her a link in patient portal.

## 2025-04-14 ENCOUNTER — OFFICE VISIT (OUTPATIENT)
Dept: INTERNAL MEDICINE | Facility: CLINIC | Age: 45
End: 2025-04-14

## 2025-04-14 ENCOUNTER — TELEPHONE (OUTPATIENT)
Dept: INTERNAL MEDICINE | Facility: CLINIC | Age: 45
End: 2025-04-14

## 2025-04-14 VITALS
DIASTOLIC BLOOD PRESSURE: 90 MMHG | WEIGHT: 105 LBS | HEIGHT: 63 IN | BODY MASS INDEX: 18.61 KG/M2 | SYSTOLIC BLOOD PRESSURE: 134 MMHG

## 2025-04-14 DIAGNOSIS — R03.0 BLOOD PRESSURE ELEVATED WITHOUT HISTORY OF HTN: ICD-10-CM

## 2025-04-14 DIAGNOSIS — F98.8 ATTENTION DEFICIT DISORDER (ADD) WITHOUT HYPERACTIVITY: Primary | Chronic | ICD-10-CM

## 2025-04-14 DIAGNOSIS — R63.4 WEIGHT LOSS: ICD-10-CM

## 2025-04-14 PROCEDURE — 98005 SYNCH AUDIO-VIDEO EST LOW 20: CPT | Mod: 95,,, | Performed by: NURSE PRACTITIONER

## 2025-04-14 RX ORDER — DEXTROAMPHETAMINE SACCHARATE, AMPHETAMINE ASPARTATE MONOHYDRATE, DEXTROAMPHETAMINE SULFATE AND AMPHETAMINE SULFATE 2.5; 2.5; 2.5; 2.5 MG/1; MG/1; MG/1; MG/1
10 CAPSULE, EXTENDED RELEASE ORAL DAILY
Qty: 30 CAPSULE | Refills: 0 | Status: SHIPPED | OUTPATIENT
Start: 2025-04-14 | End: 2025-05-14

## 2025-04-14 NOTE — PROGRESS NOTES
Patient ID: [id number]     Chief Complaint: refill med & discuss dosage      HPI:   The patient location is: her workplace  The chief complaint leading to consultation is: ADD refill    Visit type: audiovisual    Face to Face time with patient: 13 minutes  20 minutes of total time spent on the encounter, which includes face to face time and non-face to face time preparing to see the patient (eg, review of tests), Obtaining and/or reviewing separately obtained history, Documenting clinical information in the electronic or other health record, Independently interpreting results (not separately reported) and communicating results to the patient/family/caregiver, or Care coordination (not separately reported).         Each patient to whom he or she provides medical services by telemedicine is:  (1) informed of the relationship between the physician and patient and the respective role of any other health care provider with respect to management of the patient; and (2) notified that he or she may decline to receive medical services by telemedicine and may withdraw from such care at any time.        Brittany Hines is a 44 y.o. female here today for a telemedicine visit. She reports that BP has been running a bit elevated ranging 140s/90s. She has been on Adderall for the last 4-5 years. Also, issues with weight loss and maintaining weight. She is very stressed out with occupational duties.  No CP, palpitations or SOB.         -------------------------------------    Attention deficit disorder (ADD) without hyperactivity        Past Surgical History:   Procedure Laterality Date    BREAST SURGERY  05/31/2004    Augmentation    COSMETIC SURGERY  05/31/2004    NO PAST SURGERIES         Review of patient's allergies indicates:  No Known Allergies    Outpatient Medications Marked as Taking for the 4/14/25 encounter (Office Visit) with Andree Gore NP   Medication Sig Dispense Refill    ALPRAZolam (XANAX) 0.5 MG tablet  TAKE 1 TABLET(0.5 MG) BY MOUTH TWICE DAILY AS NEEDED FOR ANXIETY 60 tablet 0    citalopram (CELEXA) 20 MG tablet Take 1 tablet (20 mg total) by mouth once daily. 30 tablet 11    [DISCONTINUED] dextroamphetamine-amphetamine (ADDERALL XR) 20 MG 24 hr capsule Take 1 capsule (20 mg total) by mouth every morning. 30 capsule 0       Social History[1]     Family History   Problem Relation Name Age of Onset    Heart disease Mother Maria Hernandez     Heart disease Father Anurag Hines     Learning disabilities Father Anurag Hines     Heart disease Maternal Grandmother Renetta Fernandes     Heart disease Maternal Grandfather Carlyle Fernandes         Patient Care Team:  Kandis Hutchinson MD as PCP - General (Internal Medicine)  Brittany Nash LPN as Care Coordinator      Subjective:       Review of Systems   HENT:  Negative for hearing loss.    Eyes:  Negative for discharge.   Respiratory:  Negative for wheezing.    Cardiovascular:  Negative for chest pain and palpitations.   Gastrointestinal:  Negative for blood in stool, constipation, diarrhea and vomiting.   Genitourinary:  Negative for dysuria and hematuria.   Musculoskeletal:  Negative for neck pain.   Neurological:  Positive for weakness. Negative for headaches.   Endo/Heme/Allergies:  Negative for polydipsia.     Answers submitted by the patient for this visit:  Review of Systems Questionnaire (Submitted on 4/13/2025)  activity change: Yes  unexpected weight change: Yes  rhinorrhea: Yes  trouble swallowing: No  visual disturbance: No  chest tightness: No  polyuria: No  difficulty urinating: No  menstrual problem: Yes  joint swelling: No  arthralgias: No  confusion: No  dysphoric mood: No    See HPI for details    Constitutional: Admits Change in appetite. Eye: Denies Blurred vision.   Respiratory: Denies Shortness of breath. Denies Shortness of breath with exertion. Cardiovascular: Denies Chest pain at rest. Denies Chest pain with exertion. Denies Irregular heartbeat.  "Denies Palpitations.  Gastrointestinal: Denies Abdominal pain. DeniesDiarrhea. Denies Nausea. Denies Vomiting. Denies Hematemesis or Hematochezia.  Neurologic: Denies Headache.  Psychiatric: Denies Depression. Denies Anxiety. Denies Suicidal/Homicidal ideations.    All Other ROS: Negative except as stated in HPI.       Objective:     BP (!) 134/90 (Patient Position: Sitting)   Ht 5' 3" (1.6 m)   Wt 47.6 kg (105 lb)   BMI 18.60 kg/m²     Physical Exam    Physical Exam: LIMITED DUE TO TELEMEDICINE RESTRICTIONS.  General: Alert and oriented, No acute distress.  Head: Normocephalic, Atraumatic.  Eye: Sclera non-icteric.  Neck/Thyroid:  Full range of motion.  Respiratory: Non-labored respirations, Symmetrical chest wall expansion.  Musculoskeletal: Normal range of motion.  Integumentary: Warm, Dry, Intact, No visible suspicious lesions or rashes. No diaphoresis.   Neurologic: No focal deficits  Psychiatric: Normal interaction, Coherent speech, Euthymic mood, Appropriate affect       Assessment:       ICD-10-CM ICD-9-CM   1. Attention deficit disorder (ADD) without hyperactivity  F98.8 314.00   2. Blood pressure elevated without history of HTN  R03.0 796.2   3. Weight loss  R63.4 783.21        Plan:     1. Attention deficit disorder (ADD) without hyperactivity  Assessment & Plan:  Will reduce Adderall XR to 10mg daily--1 month supply Rx'd  Follow up one month for BP and weight recheck    Orders:  -     dextroamphetamine-amphetamine (ADDERALL XR) 10 MG 24 hr capsule; Take 1 capsule (10 mg total) by mouth once daily.  Dispense: 30 capsule; Refill: 0    2. Blood pressure elevated without history of HTN  Assessment & Plan:  HYPERTENSION RECOMMENDATIONS:  On no meds for BP and needs none at this time.  Dietary sodium restriction.  Regular aerobic exercise.  Reduce stress.  Goal BP <130/80; Encouraged to monitor blood pressure at home         3. Weight loss  Assessment & Plan:  Recommend high protein diet with regular " exercise  Follow up in 1 month and as needed             Medication List with Changes/Refills   New Medications    DEXTROAMPHETAMINE-AMPHETAMINE (ADDERALL XR) 10 MG 24 HR CAPSULE    Take 1 capsule (10 mg total) by mouth once daily.       Start Date: 4/14/2025 End Date: 5/14/2025   Current Medications    ALPRAZOLAM (XANAX) 0.5 MG TABLET    TAKE 1 TABLET(0.5 MG) BY MOUTH TWICE DAILY AS NEEDED FOR ANXIETY       Start Date: 3/27/2025 End Date: --    CITALOPRAM (CELEXA) 20 MG TABLET    Take 1 tablet (20 mg total) by mouth once daily.       Start Date: 1/6/2025  End Date: 1/6/2026   Discontinued Medications    DEXTROAMPHETAMINE-AMPHETAMINE (ADDERALL XR) 20 MG 24 HR CAPSULE    Take 1 capsule (20 mg total) by mouth every morning.       Start Date: 2/20/2025 End Date: 4/14/2025          Follow up in about 4 weeks (around 5/12/2025) for Blood Pressure Recheck, Anxiety/Depression Recheck, With Dr. Hutchinson. In addition to their scheduled follow up, the patient has also been instructed to follow up on as needed basis.       Video Time Documentation:  Spent 10 minutes with patient face to face discussed health concerns. More than 50% of this time was spent in counseling and coordination of care.         [1]   Social History  Socioeconomic History    Marital status: Single   Tobacco Use    Smoking status: Every Day     Current packs/day: 1.00     Average packs/day: 1 pack/day for 30.3 years (30.3 ttl pk-yrs)     Types: Cigarettes     Start date: 1995    Smokeless tobacco: Never   Substance and Sexual Activity    Alcohol use: Yes     Alcohol/week: 5.0 standard drinks of alcohol     Types: 5 Drinks containing 0.5 oz of alcohol per week     Comment: occasionally    Drug use: Not Currently    Sexual activity: Not Currently     Partners: Male     Birth control/protection: None     Social Drivers of Health     Financial Resource Strain: Medium Risk (4/13/2025)    Overall Financial Resource Strain (CARDIA)     Difficulty of Paying  Living Expenses: Somewhat hard   Food Insecurity: Food Insecurity Present (4/13/2025)    Hunger Vital Sign     Worried About Running Out of Food in the Last Year: Sometimes true     Ran Out of Food in the Last Year: Patient declined   Transportation Needs: No Transportation Needs (4/13/2025)    PRAPARE - Transportation     Lack of Transportation (Medical): No     Lack of Transportation (Non-Medical): No   Physical Activity: Sufficiently Active (4/13/2025)    Exercise Vital Sign     Days of Exercise per Week: 5 days     Minutes of Exercise per Session: 60 min   Stress: No Stress Concern Present (4/13/2025)    [place] [employer] - Occupational Stress Questionnaire     Feeling of Stress : Only a little   Housing Stability: High Risk (4/13/2025)    Housing Stability Vital Sign     Unable to Pay for Housing in the Last Year: No     Number of Times Moved in the Last Year: 2     Homeless in the Last Year: No

## 2025-04-14 NOTE — ASSESSMENT & PLAN NOTE
HYPERTENSION RECOMMENDATIONS:  On no meds for BP and needs none at this time.  Dietary sodium restriction.  Regular aerobic exercise.  Reduce stress.  Goal BP <130/80; Encouraged to monitor blood pressure at home

## 2025-04-14 NOTE — ASSESSMENT & PLAN NOTE
Will reduce Adderall XR to 10mg daily--1 month supply Rx'd  Follow up one month for BP and weight recheck

## 2025-05-07 DIAGNOSIS — F32.A ANXIETY AND DEPRESSION: Chronic | ICD-10-CM

## 2025-05-07 DIAGNOSIS — F41.9 ANXIETY AND DEPRESSION: Chronic | ICD-10-CM

## 2025-05-07 RX ORDER — ALPRAZOLAM 0.5 MG/1
0.5 TABLET ORAL 2 TIMES DAILY PRN
Qty: 60 TABLET | Refills: 1 | Status: SHIPPED | OUTPATIENT
Start: 2025-05-07

## 2025-07-22 ENCOUNTER — PATIENT OUTREACH (OUTPATIENT)
Dept: ADMINISTRATIVE | Facility: HOSPITAL | Age: 45
End: 2025-07-22

## 2025-07-22 DIAGNOSIS — Z12.31 ENCOUNTER FOR SCREENING MAMMOGRAM FOR BREAST CANCER: Primary | ICD-10-CM

## 2025-08-29 ENCOUNTER — PATIENT MESSAGE (OUTPATIENT)
Facility: CLINIC | Age: 45
End: 2025-08-29